# Patient Record
Sex: FEMALE | Race: WHITE | NOT HISPANIC OR LATINO | Employment: STUDENT | URBAN - METROPOLITAN AREA
[De-identification: names, ages, dates, MRNs, and addresses within clinical notes are randomized per-mention and may not be internally consistent; named-entity substitution may affect disease eponyms.]

---

## 2021-09-15 ENCOUNTER — HOSPITAL ENCOUNTER (EMERGENCY)
Facility: HOSPITAL | Age: 26
Discharge: HOME | End: 2021-09-15
Attending: EMERGENCY MEDICINE
Payer: COMMERCIAL

## 2021-09-15 VITALS
WEIGHT: 155 LBS | HEIGHT: 68 IN | RESPIRATION RATE: 16 BRPM | BODY MASS INDEX: 23.49 KG/M2 | SYSTOLIC BLOOD PRESSURE: 126 MMHG | TEMPERATURE: 98 F | HEART RATE: 75 BPM | OXYGEN SATURATION: 99 % | DIASTOLIC BLOOD PRESSURE: 71 MMHG

## 2021-09-15 DIAGNOSIS — G43.801 OTHER MIGRAINE WITH STATUS MIGRAINOSUS, NOT INTRACTABLE: Primary | ICD-10-CM

## 2021-09-15 LAB
B-HCG UR QL: NEGATIVE
CTP QC/QA: YES

## 2021-09-15 PROCEDURE — 63600175 PHARM REV CODE 636 W HCPCS: Performed by: EMERGENCY MEDICINE

## 2021-09-15 PROCEDURE — 25000003 PHARM REV CODE 250: Performed by: EMERGENCY MEDICINE

## 2021-09-15 PROCEDURE — 99284 EMERGENCY DEPT VISIT MOD MDM: CPT | Mod: 25

## 2021-09-15 PROCEDURE — 96375 TX/PRO/DX INJ NEW DRUG ADDON: CPT

## 2021-09-15 PROCEDURE — 81025 URINE PREGNANCY TEST: CPT | Performed by: EMERGENCY MEDICINE

## 2021-09-15 PROCEDURE — 96374 THER/PROPH/DIAG INJ IV PUSH: CPT

## 2021-09-15 RX ORDER — PROCHLORPERAZINE EDISYLATE 5 MG/ML
10 INJECTION INTRAMUSCULAR; INTRAVENOUS
Status: COMPLETED | OUTPATIENT
Start: 2021-09-15 | End: 2021-09-15

## 2021-09-15 RX ORDER — ELETRIPTAN HYDROBROMIDE 40 MG/1
40 TABLET, FILM COATED ORAL
COMMUNITY
End: 2022-02-23

## 2021-09-15 RX ORDER — DIPHENHYDRAMINE HYDROCHLORIDE 50 MG/ML
50 INJECTION INTRAMUSCULAR; INTRAVENOUS
Status: COMPLETED | OUTPATIENT
Start: 2021-09-15 | End: 2021-09-15

## 2021-09-15 RX ADMIN — PROCHLORPERAZINE EDISYLATE 10 MG: 5 INJECTION INTRAMUSCULAR; INTRAVENOUS at 05:09

## 2021-09-15 RX ADMIN — DIPHENHYDRAMINE HYDROCHLORIDE 50 MG: 50 INJECTION INTRAMUSCULAR; INTRAVENOUS at 05:09

## 2021-09-15 RX ADMIN — SODIUM CHLORIDE 1000 ML: 0.9 INJECTION, SOLUTION INTRAVENOUS at 05:09

## 2022-09-28 ENCOUNTER — HOSPITAL ENCOUNTER (EMERGENCY)
Facility: HOSPITAL | Age: 27
Discharge: HOME | End: 2022-09-28
Attending: EMERGENCY MEDICINE
Payer: COMMERCIAL

## 2022-09-28 VITALS
RESPIRATION RATE: 16 BRPM | OXYGEN SATURATION: 100 % | HEIGHT: 68 IN | SYSTOLIC BLOOD PRESSURE: 132 MMHG | HEART RATE: 64 BPM | TEMPERATURE: 98 F | DIASTOLIC BLOOD PRESSURE: 92 MMHG | WEIGHT: 150 LBS | BODY MASS INDEX: 22.73 KG/M2

## 2022-09-28 DIAGNOSIS — R11.0 NAUSEA: ICD-10-CM

## 2022-09-28 DIAGNOSIS — G43.809 OTHER MIGRAINE WITHOUT STATUS MIGRAINOSUS, NOT INTRACTABLE: Primary | ICD-10-CM

## 2022-09-28 LAB
B-HCG UR QL: NEGATIVE
CTP QC/QA: YES

## 2022-09-28 PROCEDURE — 96374 THER/PROPH/DIAG INJ IV PUSH: CPT

## 2022-09-28 PROCEDURE — 99284 EMERGENCY DEPT VISIT MOD MDM: CPT | Mod: 25

## 2022-09-28 PROCEDURE — 63600175 PHARM REV CODE 636 W HCPCS: Performed by: NURSE PRACTITIONER

## 2022-09-28 PROCEDURE — 96375 TX/PRO/DX INJ NEW DRUG ADDON: CPT

## 2022-09-28 PROCEDURE — 81025 URINE PREGNANCY TEST: CPT | Performed by: NURSE PRACTITIONER

## 2022-09-28 RX ORDER — METOCLOPRAMIDE HYDROCHLORIDE 5 MG/ML
10 INJECTION INTRAMUSCULAR; INTRAVENOUS
Status: COMPLETED | OUTPATIENT
Start: 2022-09-28 | End: 2022-09-28

## 2022-09-28 RX ORDER — DIPHENHYDRAMINE HYDROCHLORIDE 50 MG/ML
25 INJECTION INTRAMUSCULAR; INTRAVENOUS
Status: COMPLETED | OUTPATIENT
Start: 2022-09-28 | End: 2022-09-28

## 2022-09-28 RX ORDER — METOCLOPRAMIDE 10 MG/1
10 TABLET ORAL EVERY 6 HOURS
Qty: 30 TABLET | Refills: 0 | Status: SHIPPED | OUTPATIENT
Start: 2022-09-28

## 2022-09-28 RX ORDER — KETOROLAC TROMETHAMINE 30 MG/ML
15 INJECTION, SOLUTION INTRAMUSCULAR; INTRAVENOUS
Status: COMPLETED | OUTPATIENT
Start: 2022-09-28 | End: 2022-09-28

## 2022-09-28 RX ADMIN — KETOROLAC TROMETHAMINE 15 MG: 30 INJECTION, SOLUTION INTRAMUSCULAR; INTRAVENOUS at 07:09

## 2022-09-28 RX ADMIN — DIPHENHYDRAMINE HYDROCHLORIDE 25 MG: 50 INJECTION, SOLUTION INTRAMUSCULAR; INTRAVENOUS at 07:09

## 2022-09-28 RX ADMIN — METOCLOPRAMIDE 10 MG: 5 INJECTION, SOLUTION INTRAMUSCULAR; INTRAVENOUS at 07:09

## 2022-09-28 NOTE — Clinical Note
"Annemarie"Dave Gupta was seen and treated in our emergency department on 9/28/2022.  She may return to work on 10/01/2022.       If you have any questions or concerns, please don't hesitate to call.      Toshia Perrin NP"

## 2022-09-29 NOTE — ED PROVIDER NOTES
Encounter Date: 9/28/2022       History     Chief Complaint   Patient presents with    Headache     Pt c/o migraine for two days.      27-year-old female with a history of migraine headaches, presents to the ER today with complaints of ongoing migraine headache described as right frontal headache throbbing 8/10 in severity that has been ongoing and constant in nature for the past 3 days despite taking her prescribed migraine headache medication.  She states overall her headache feels similar to her prior migraines feel like that she suffers from, however this migraine has not been able to get relief from her daily and as needed medicine that is prescribed by her neurologist.  She reports associated nausea but reports no vomiting. She denies any vision changes.  No confusion.  No fever no neck pain or stiffness no rashes or other complaints or concerns.  She states her previous medical history.  She states she has needed to seek ER treatment for further management of her migraine headaches 1 time in the past and was able to get successful relief with IV medicines.  She is here for further management.    Review of patient's allergies indicates:   Allergen Reactions    Montelukast Itching    Sumatriptan      Past Medical History:   Diagnosis Date    Strep throat      Past Surgical History:   Procedure Laterality Date    INGUINAL HERNIA REPAIR       Family History   Problem Relation Age of Onset    Skin cancer Mother      Social History     Tobacco Use    Smoking status: Never    Smokeless tobacco: Never   Substance Use Topics    Alcohol use: No     Review of Systems   Constitutional:  Negative for chills, diaphoresis, fatigue and fever.   HENT:  Negative for congestion, ear discharge, ear pain, postnasal drip, rhinorrhea, sinus pressure, sinus pain, sneezing, sore throat and trouble swallowing.    Eyes:  Negative for photophobia and visual disturbance.   Respiratory:  Negative for cough, choking, chest tightness,  shortness of breath and wheezing.    Cardiovascular:  Negative for chest pain, palpitations and leg swelling.   Gastrointestinal:  Positive for nausea. Negative for abdominal pain, constipation, diarrhea and vomiting.   Endocrine: Negative for polydipsia, polyphagia and polyuria.   Genitourinary:  Negative for difficulty urinating, dysuria, flank pain and frequency.   Musculoskeletal:  Negative for back pain.   Skin:  Negative for rash.   Allergic/Immunologic: Negative for immunocompromised state.   Neurological:  Positive for headaches. Negative for dizziness, seizures, syncope, speech difficulty, weakness and light-headedness.   Hematological:  Does not bruise/bleed easily.   Psychiatric/Behavioral:  Negative for agitation and confusion.    All other systems reviewed and are negative.    Physical Exam     Initial Vitals [09/28/22 1905]   BP Pulse Resp Temp SpO2   (!) 156/100 75 18 98.8 °F (37.1 °C) 100 %      MAP       --         Physical Exam    Nursing note and vitals reviewed.  Constitutional: She appears well-developed and well-nourished. She is not diaphoretic. No distress.   HENT:   Head: Normocephalic and atraumatic.   Right Ear: External ear normal.   Left Ear: External ear normal.   Nose: Nose normal.   Mouth/Throat: Oropharynx is clear and moist. No oropharyngeal exudate.   Eyes: Conjunctivae are normal.   Neck: Neck supple.   Normal range of motion.  Cardiovascular:  Normal rate.           No murmur heard.  Pulmonary/Chest: Breath sounds normal. No respiratory distress. She has no wheezes. She has no rhonchi. She has no rales.   Abdominal: Abdomen is soft. Bowel sounds are normal. There is no abdominal tenderness.   Musculoskeletal:         General: No tenderness or edema. Normal range of motion.      Cervical back: Normal range of motion and neck supple.     Neurological: She is alert and oriented to person, place, and time. She has normal strength. GCS score is 15. GCS eye subscore is 4. GCS verbal  subscore is 5. GCS motor subscore is 6.   Skin: Skin is warm and dry. Capillary refill takes less than 2 seconds. No rash noted. No erythema.   Psychiatric: She has a normal mood and affect. Thought content normal.       ED Course   Procedures  Labs Reviewed   POCT URINE PREGNANCY     Results for orders placed or performed during the hospital encounter of 09/28/22   POCT urine pregnancy   Result Value Ref Range    POC Preg Test, Ur Negative Negative     Acceptable Yes           Imaging Results    None          Medications   sodium chloride 0.9% bolus 1,000 mL (has no administration in time range)   ketorolac injection 15 mg (15 mg Intravenous Given 9/28/22 1940)   metoclopramide HCl injection 10 mg (10 mg Intravenous Given 9/28/22 1941)   diphenhydrAMINE injection 25 mg (25 mg Intravenous Given 9/28/22 1942)     Medical Decision Making:   ED Management:  While in the ER patient received an IV with 1 L IV fluid bolus 15 mg of Toradol, 25 mg of Benadryl and 10 mg of Reglan IV and she was monitored for improvement in her symptoms.  After receiving the medicine and fluids, she states her headache is almost now completely resolved and feels much better.  We will discharge home with a prescription of Reglan and instructions for patient to follow back up with her neurologist for ER visit follow-up in re-evaluation due to her migraine headache. ER return precautions discussed in detail she understands she should return for any new or worse symptoms. She does not have a fever, no meningeal headache symptoms present on exam.  No red flag headaches symptoms present to necessitate head CT imaging today.  She appears in no distress at this time her vitals are stable and she is ready for discharge.                        Clinical Impression:   Final diagnoses:  [G43.809] Other migraine without status migrainosus, not intractable (Primary)  [R11.0] Nausea      ED Disposition Condition    Discharge Stable          ED  Prescriptions       Medication Sig Dispense Start Date End Date Auth. Provider    metoclopramide HCl (REGLAN) 10 MG tablet Take 1 tablet (10 mg total) by mouth every 6 (six) hours. 30 tablet 9/28/2022 -- Toshia Perrin NP          Follow-up Information       Follow up With Specialties Details Why Contact Info Additional Information    Jade Cueto MD Family Medicine Schedule an appointment as soon as possible for a visit in 3 days for ER visit follow up and re-evaluation [facility] [facility]  [address]  [facility] [address]  [phone]       Neurologist  Schedule an appointment as soon as possible for a visit in 2 days for ER visit follow up and re-evaluation      [facility] - Emergency Dept Emergency Medicine Go to  As needed, If symptoms worsen [address] [facility] [address]  [phone] 1st floor             Toshia Perrin NP  09/28/22 2023

## 2022-10-26 DIAGNOSIS — N64.89 OTHER SPECIFIED DISORDERS OF BREAST: Primary | ICD-10-CM

## 2022-11-25 ENCOUNTER — HOSPITAL ENCOUNTER (OUTPATIENT)
Dept: RADIOLOGY | Facility: HOSPITAL | Age: 27
Discharge: HOME | End: 2022-11-25
Attending: STUDENT IN AN ORGANIZED HEALTH CARE EDUCATION/TRAINING PROGRAM
Payer: COMMERCIAL

## 2022-11-25 DIAGNOSIS — N64.89 OTHER SPECIFIED DISORDERS OF BREAST: ICD-10-CM

## 2022-11-25 PROCEDURE — 76642 ULTRASOUND BREAST LIMITED: CPT | Mod: TC,PO,LT

## 2024-08-27 ENCOUNTER — TELEPHONE (OUTPATIENT)
Dept: MATERNAL FETAL MEDICINE | Facility: CLINIC | Age: 29
End: 2024-08-27
Payer: COMMERCIAL

## 2024-08-27 DIAGNOSIS — O43.102 PLACENTAL ABNORMALITY IN SECOND TRIMESTER: Primary | ICD-10-CM

## 2024-08-27 NOTE — TELEPHONE ENCOUNTER
Spoke with patient's  and informed him that he would need to call his insurance to clarify with them .    ----- Message from Toshia Ahn MA sent at 8/27/2024  2:15 PM CDT -----  Regarding: Inquiry about Coverage  Good Afternoon,     I received a call from patient's , Azar Gupta. He stated that his insurance has Dr. Barker listed twice. One says he is in network and the other says out of network.     Mr. Gupta wanted to know which it was. He can be reached at [phone].     Thanks,   Tsohia

## 2024-08-29 ENCOUNTER — HOSPITAL ENCOUNTER (EMERGENCY)
Facility: HOSPITAL | Age: 29
Discharge: HOME | End: 2024-08-29
Attending: EMERGENCY MEDICINE
Payer: COMMERCIAL

## 2024-08-29 VITALS
RESPIRATION RATE: 18 BRPM | SYSTOLIC BLOOD PRESSURE: 135 MMHG | HEIGHT: 68 IN | HEART RATE: 97 BPM | DIASTOLIC BLOOD PRESSURE: 85 MMHG | BODY MASS INDEX: 26.52 KG/M2 | OXYGEN SATURATION: 98 % | WEIGHT: 175 LBS | TEMPERATURE: 98 F

## 2024-08-29 DIAGNOSIS — O20.9 VAGINAL BLEEDING AFFECTING EARLY PREGNANCY: ICD-10-CM

## 2024-08-29 LAB
ALBUMIN SERPL BCP-MCNC: 3.5 G/DL (ref 3.5–5.2)
ALP SERPL-CCNC: 35 U/L (ref 55–135)
ALT SERPL W/O P-5'-P-CCNC: 40 U/L (ref 10–44)
ANION GAP SERPL CALC-SCNC: 6 MMOL/L (ref 8–16)
AST SERPL-CCNC: 23 U/L (ref 10–40)
BACTERIA #/AREA URNS HPF: ABNORMAL /HPF
BASOPHILS # BLD AUTO: 0.06 K/UL (ref 0–0.2)
BASOPHILS NFR BLD: 0.5 % (ref 0–1.9)
BILIRUB SERPL-MCNC: 0.2 MG/DL (ref 0.1–1)
BILIRUB UR QL STRIP: NEGATIVE
BUN SERPL-MCNC: 8 MG/DL (ref 6–20)
CALCIUM SERPL-MCNC: 9.2 MG/DL (ref 8.7–10.5)
CHLORIDE SERPL-SCNC: 104 MMOL/L (ref 95–110)
CLARITY UR: CLEAR
CO2 SERPL-SCNC: 26 MMOL/L (ref 23–29)
COLOR UR: YELLOW
CREAT SERPL-MCNC: 0.5 MG/DL (ref 0.5–1.4)
DIFFERENTIAL METHOD BLD: ABNORMAL
EOSINOPHIL # BLD AUTO: 0.4 K/UL (ref 0–0.5)
EOSINOPHIL NFR BLD: 3.2 % (ref 0–8)
ERYTHROCYTE [DISTWIDTH] IN BLOOD BY AUTOMATED COUNT: 13.7 % (ref 11.5–14.5)
EST. GFR  (NO RACE VARIABLE): >60 ML/MIN/1.73 M^2
GLUCOSE SERPL-MCNC: 89 MG/DL (ref 70–110)
GLUCOSE UR QL STRIP: NEGATIVE
HCG INTACT+B SERPL-ACNC: NORMAL MIU/ML
HCT VFR BLD AUTO: 33.8 % (ref 37–48.5)
HGB BLD-MCNC: 10.8 G/DL (ref 12–16)
HGB UR QL STRIP: ABNORMAL
IMM GRANULOCYTES # BLD AUTO: 0.1 K/UL (ref 0–0.04)
IMM GRANULOCYTES NFR BLD AUTO: 0.8 % (ref 0–0.5)
KETONES UR QL STRIP: NEGATIVE
LEUKOCYTE ESTERASE UR QL STRIP: NEGATIVE
LYMPHOCYTES # BLD AUTO: 1.8 K/UL (ref 1–4.8)
LYMPHOCYTES NFR BLD: 14.1 % (ref 18–48)
MCH RBC QN AUTO: 25.1 PG (ref 27–31)
MCHC RBC AUTO-ENTMCNC: 32 G/DL (ref 32–36)
MCV RBC AUTO: 78 FL (ref 82–98)
MICROSCOPIC COMMENT: ABNORMAL
MONOCYTES # BLD AUTO: 0.7 K/UL (ref 0.3–1)
MONOCYTES NFR BLD: 5.1 % (ref 4–15)
NEUTROPHILS # BLD AUTO: 9.8 K/UL (ref 1.8–7.7)
NEUTROPHILS NFR BLD: 76.3 % (ref 38–73)
NITRITE UR QL STRIP: NEGATIVE
NRBC BLD-RTO: 0 /100 WBC
PH UR STRIP: 7 [PH] (ref 5–8)
PLATELET # BLD AUTO: 232 K/UL (ref 150–450)
PMV BLD AUTO: 9.7 FL (ref 9.2–12.9)
POTASSIUM SERPL-SCNC: 3.4 MMOL/L (ref 3.5–5.1)
PROT SERPL-MCNC: 6.3 G/DL (ref 6–8.4)
PROT UR QL STRIP: NEGATIVE
RBC # BLD AUTO: 4.31 M/UL (ref 4–5.4)
RBC #/AREA URNS HPF: 0 /HPF (ref 0–4)
SODIUM SERPL-SCNC: 136 MMOL/L (ref 136–145)
SP GR UR STRIP: 1.01 (ref 1–1.03)
SQUAMOUS #/AREA URNS HPF: 3 /HPF
URN SPEC COLLECT METH UR: ABNORMAL
UROBILINOGEN UR STRIP-ACNC: NEGATIVE EU/DL
WBC # BLD AUTO: 12.81 K/UL (ref 3.9–12.7)
WBC #/AREA URNS HPF: 4 /HPF (ref 0–5)

## 2024-08-29 PROCEDURE — 85025 COMPLETE CBC W/AUTO DIFF WBC: CPT | Performed by: PHYSICIAN ASSISTANT

## 2024-08-29 PROCEDURE — 84702 CHORIONIC GONADOTROPIN TEST: CPT | Performed by: PHYSICIAN ASSISTANT

## 2024-08-29 PROCEDURE — 99284 EMERGENCY DEPT VISIT MOD MDM: CPT | Mod: 25

## 2024-08-29 PROCEDURE — 81001 URINALYSIS AUTO W/SCOPE: CPT | Performed by: PHYSICIAN ASSISTANT

## 2024-08-29 PROCEDURE — 80053 COMPREHEN METABOLIC PANEL: CPT | Performed by: PHYSICIAN ASSISTANT

## 2024-08-29 NOTE — Clinical Note
"Annemarie"Dave Gupta was seen and treated in our emergency department on 8/29/2024.  She may return to work on 08/31/2024.       If you have any questions or concerns, please don't hesitate to call.      Yanet Donovan PA-C"

## 2024-08-29 NOTE — DISCHARGE INSTRUCTIONS
Please return to the emergency department if you experience increasing vaginal bleeding. Pelvic rest is recommended until follow up with your OBGYN

## 2024-08-29 NOTE — ED PROVIDER NOTES
Encounter Date: 2024       History     Chief Complaint   Patient presents with    PREGNANCY PROBLEM     APPROX 18 WEEKS PREG    Vaginal Bleeding     X 1 DAY     This is a 29-year-old  female at 18 weeks pregnant presenting to the emergency department for vaginal spotting.  She currently follows up with Dr. Schwartz.  Patient states that on Monday she had an ultrasound where they informed her that she had a vas a previa.  She 1st experienced vaginal spotting yesterday and contacted Dr. Schwartz who told her that if it occurred again to go to the emergency department.  She then experienced vaginal spotting again today and she contacted  again for reassurance.  She was informed again to come to the emergency department.  She denies nausea, vomiting abdominal pain, vaginal discharge, fever.  Reports that she is Rh positive and has current paperwork with her from her OBGYN        Review of patient's allergies indicates:  No Known Allergies  Past Medical History:   Diagnosis Date    Strep throat      Past Surgical History:   Procedure Laterality Date    INGUINAL HERNIA REPAIR       Family History   Problem Relation Name Age of Onset    Skin cancer Mother       Social History     Tobacco Use    Smoking status: Never    Smokeless tobacco: Never   Substance Use Topics    Alcohol use: No     Review of Systems   Constitutional: Negative.    Respiratory: Negative.     Cardiovascular: Negative.    Gastrointestinal: Negative.    Genitourinary:  Positive for vaginal bleeding.       Physical Exam     Initial Vitals [24 1314]   BP Pulse Resp Temp SpO2   (!) 142/97 106 18 98.8 °F (37.1 °C) 99 %      MAP       --         Physical Exam    Vitals reviewed.  Constitutional: She appears well-developed and well-nourished. She is not diaphoretic. No distress.   HENT:   Mouth/Throat: Oropharynx is clear and moist.   Eyes: Conjunctivae and EOM are normal.   Neck:   Normal range of motion.  Cardiovascular:  Normal  rate and regular rhythm.           Pulmonary/Chest: Breath sounds normal.   Abdominal: Abdomen is soft. She exhibits no distension. There is no abdominal tenderness. There is no rebound and no guarding.   Genitourinary:    Vagina normal.      Genitourinary Comments: Speculum exam not performed due to vasa previa.      Musculoskeletal:      Cervical back: Normal range of motion.     Skin: Skin is warm. Capillary refill takes less than 2 seconds.         ED Course   Procedures  Labs Reviewed   CBC W/ AUTO DIFFERENTIAL - Abnormal       Result Value    WBC 12.81 (*)     RBC 4.31      Hemoglobin 10.8 (*)     Hematocrit 33.8 (*)     MCV 78 (*)     MCH 25.1 (*)     MCHC 32.0      RDW 13.7      Platelets 232      MPV 9.7      Immature Granulocytes 0.8 (*)     Gran # (ANC) 9.8 (*)     Immature Grans (Abs) 0.10 (*)     Lymph # 1.8      Mono # 0.7      Eos # 0.4      Baso # 0.06      nRBC 0      Gran % 76.3 (*)     Lymph % 14.1 (*)     Mono % 5.1      Eosinophil % 3.2      Basophil % 0.5      Differential Method Automated      Narrative:     Release to patient->Immediate   COMPREHENSIVE METABOLIC PANEL - Abnormal    Sodium 136      Potassium 3.4 (*)     Chloride 104      CO2 26      Glucose 89      BUN 8      Creatinine 0.5      Calcium 9.2      Total Protein 6.3      Albumin 3.5      Total Bilirubin 0.2      Alkaline Phosphatase 35 (*)     AST 23      ALT 40      eGFR >60.0      Anion Gap 6 (*)     Narrative:     Release to patient->Immediate   URINALYSIS, REFLEX TO URINE CULTURE - Abnormal    Specimen UA Urine, Clean Catch      Color, UA Yellow      Appearance, UA Clear      pH, UA 7.0      Specific Gravity, UA 1.015      Protein, UA Negative      Glucose, UA Negative      Ketones, UA Negative      Bilirubin (UA) Negative      Occult Blood UA 3+ (*)     Nitrite, UA Negative      Urobilinogen, UA Negative      Leukocytes, UA Negative      Narrative:     Specimen Source->Urine   URINALYSIS MICROSCOPIC - Abnormal    RBC, UA 0       WBC, UA 4      Bacteria Few (*)     Squam Epithel, UA 3      Microscopic Comment SEE COMMENT      Narrative:     Specimen Source->Urine   HCG, QUANTITATIVE    HCG Quant [id number]      Narrative:     Release to patient->Immediate          Imaging Results               US OB 14+ Wks, TransAbd, Single Gestation (Final result)  Result time 08/29/24 14:36:58      Final result by William Guadarrama MD (08/29/24 14:36:58)                   Narrative:    CLINICAL HISTORY:  ([id number])28 y/o  (1995) F    Labor and delivery complicated by vasa previa, not applicable or unspecified    TECHNIQUE:  (A#[id number], exam time 8/29/2024 14:32)    US OB 14+ WEEKS, TRANSABDOM, SINGLE GESTATION [id number]    Ultrasound of the gravid uterus and fetus was obtained using grayscale color flow and M-mode where appropriate.    COMPARISON:  None available.    FINDINGS:  Fetus: Single, live, intrauterine, breech position  Placenta:  Location: Anterior, abutting the internal cervical os.  In addition the cord is at the level of the internal cervical os (compatible with a history of acid previa).  Cord insertion: The placental cord insertion was not well visualized.  Amniotic fluid index: Within normal limits.    Fetal heart tones are identified at 153 beats per minute.    Biometry  Biparietal diameter: 4.3 cm (18 weeks 6 days)  Head circumference: 16.1 cm (18 weeks 6 days)  Abdomen circumference: 12.9 cm (18 weeks 4 days)  Femur length: 2.6 cm (18 weeks 0 days)    The average ultrasound age is 18 weeks 4 days +/-1 week 2 days.  HC/AC 1.24,which is within the normal range for the gestational age.  Estimated fetal weight of 235 g +/-35 g.    Maternal structures  Ovaries/adnexa: Not seen, obscured.  Cervix: 3.4 cm, closed without evidence of amniotic funneling.  Cul-de-sac: No abnormal fluid.      IMPRESSION  1. Single live intrauterine gestation with an average ultrasound age of 18 weeks 4 days +/-1 week 2 days.  2.  Anterior low-lying  placenta, and findings compatible with a history of as a previa.  Consider correlation with the symptoms, history and interval follow-up.      3.  Closed cervix without evidence of amniotic funneling, and normal LIVAN.    4.  Fetal heart tones identified at 153 beats per minute.    This report was flagged in Epic as abnormal.      Electronically signed by: William Guadarrama  Date:    2024  Time:    14:36                                     Medications - No data to display  Medical Decision Making  29-year-old  female presenting to the emergency department at 18 weeks pregnant with vaginal spotting.  Considerations include but not limited to threatened miscarriage, has a previa, placenta previa, ectopic pregnancy, spontaneous .  Vitals stable.  Patient afebrile. Was recently diagnosed with facet previa during a transvaginal ultrasound on Monday.  This is the patient's 2nd day spotting and she was informed by her OBGYN Dr. Schwartz to come to the emergency department.  Her labs are fairly unremarkable.  Physical exam is unremarkable.  Benign abdominal exam.  A speculum exam not performed due to vascular previa.  Ultrasound shows that her cervical os is closed.  Single intrauterine gestation at 18 weeks 4 days with fetal heart tones of 153 beats per minute.  I did contact Dr. Cohen who was obstetrics on-call to confirm that nothing more needs to be done for the patient.  She did confirm with me that the patient just needs pelvic rest and to follow up with her OBGYN in a week.  Patient does have an appointment scheduled with her MFM 1 week from today and an appointment with Dr. Schwartz in 2 weeks. Patient was informed to return to the emergency department if she continues to experience vaginal bleeding or if it is significantly increases.  Patient also informed that pelvic rest as necessary.  She verbalized her understanding of the plan and agreed.  Plan was discussed with my attending all questions were  answered at the bedside.                                      Clinical Impression:  Final diagnoses:  [O69.4XX0] Vasa previa  [O20.9] Vaginal bleeding affecting early pregnancy          ED Disposition Condition    Discharge Stable          ED Prescriptions    None       Follow-up Information       Follow up With Specialties Details Why Contact Info    María Elena Schwartz MD Obstetrics and Gynecology Call   [address] [place] [address]  [phone]               Yanet Donovan, PA-C  08/29/24 [id number]

## 2024-08-29 NOTE — FIRST PROVIDER EVALUATION
Emergency Department TeleTriage Encounter Note      CHIEF COMPLAINT    Chief Complaint   Patient presents with    PREGNANCY PROBLEM     APPROX 18 WEEKS PREG    Vaginal Bleeding     X 1 DAY       VITAL SIGNS   Initial Vitals [24 1314]   BP Pulse Resp Temp SpO2   (!) 142/97 106 18 98.8 °F (37.1 °C) 99 %      MAP       --            ALLERGIES    Review of patient's allergies indicates:  No Known Allergies    PROVIDER TRIAGE NOTE  Patient 18 weeks pregnant presenting with brown vaginal spotting. Patient has Vasa Previa. . OB advised her to come to the ED for evaluation.       ORDERS  Labs Reviewed - No data to display    ED Orders (720h ago, onward)      None              Virtual Visit Note: The provider triage portion of this emergency department evaluation and documentation was performed via [employer], a HIPAA-compliant telemedicine application, in concert with a tele-presenter in the room. A face to face patient evaluation with one of my colleagues will occur once the patient is placed in an emergency department room.      DISCLAIMER: This note was prepared with [employer] voice recognition transcription software. Garbled syntax, mangled pronouns, and other bizarre constructions may be attributed to that software system.

## 2024-09-06 ENCOUNTER — OFFICE VISIT (OUTPATIENT)
Dept: MATERNAL FETAL MEDICINE | Facility: CLINIC | Age: 29
End: 2024-09-06
Payer: COMMERCIAL

## 2024-09-06 ENCOUNTER — PROCEDURE VISIT (OUTPATIENT)
Dept: MATERNAL FETAL MEDICINE | Facility: CLINIC | Age: 29
End: 2024-09-06
Payer: COMMERCIAL

## 2024-09-06 VITALS
WEIGHT: 180.56 LBS | DIASTOLIC BLOOD PRESSURE: 89 MMHG | SYSTOLIC BLOOD PRESSURE: 135 MMHG | BODY MASS INDEX: 27.36 KG/M2 | HEIGHT: 68 IN | HEART RATE: 104 BPM

## 2024-09-06 DIAGNOSIS — Z36.89 ENCOUNTER FOR ULTRASOUND TO CHECK FETAL GROWTH: Primary | ICD-10-CM

## 2024-09-06 DIAGNOSIS — F41.9 ANXIETY DURING PREGNANCY: ICD-10-CM

## 2024-09-06 DIAGNOSIS — O46.8X2 SUBCHORIONIC HEMORRHAGE OF PLACENTA IN SECOND TRIMESTER: ICD-10-CM

## 2024-09-06 DIAGNOSIS — O43.102 PLACENTAL ABNORMALITY IN SECOND TRIMESTER: ICD-10-CM

## 2024-09-06 DIAGNOSIS — O99.340 ANXIETY DURING PREGNANCY: ICD-10-CM

## 2024-09-06 DIAGNOSIS — Z14.1 CYSTIC FIBROSIS CARRIER: ICD-10-CM

## 2024-09-06 DIAGNOSIS — O44.02 PLACENTA PREVIA IN SECOND TRIMESTER: Primary | ICD-10-CM

## 2024-09-06 PROCEDURE — 99999 PR PBB SHADOW E&M-EST. PATIENT-LVL III: CPT | Mod: PBBFAC,,, | Performed by: OBSTETRICS & GYNECOLOGY

## 2024-09-06 RX ORDER — MAGNESIUM 200 MG
TABLET ORAL ONCE
COMMUNITY

## 2024-09-06 RX ORDER — TRAZODONE HYDROCHLORIDE 100 MG/1
1 TABLET ORAL NIGHTLY
COMMUNITY
Start: 2024-07-05

## 2024-09-06 NOTE — ASSESSMENT & PLAN NOTE
1-6% of patients on a routine u/s have a previa between 10-20 wks. Around 70% resolve upon imaging in 3rd trimester.  Risks factors for previa include endometrial scarring in the upper segment of the uterus, multiple prior uterine surgeries (D&C), smoking, IVF and multiple gestations. Discussed need for , possible classical, if persistent. We discussed reasons vaginal delivery is not possible if placenta previa persists. Discussed risks of late /early term delivery.    We discussed her recent bleeding in the setting of noted EDDI (see below).  Discussed calling guidelines and possibility of life-threatnening bleeding.  Understands that with heavy VB calling 911 is necessary.      Recommendations:  Re-evaluation by ultrasound in 5 weeks to monitor for resolution vs persistence.  Pelvic rest suggested, with close monitoring for increasing bleeding.  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

## 2024-09-06 NOTE — ASSESSMENT & PLAN NOTE
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 These results were not available. Will defer to primary OB for further management.

## 2024-09-06 NOTE — ASSESSMENT & PLAN NOTE
Currently on Effexor 37.5mg daily and Trazadone 50mg daily  Sees Dr. Cueto outside of pregnancy for this.  This was not discussed with patient today.  Management per primary OB provider.

## 2024-09-06 NOTE — ASSESSMENT & PLAN NOTE
Although subchorionic hemorrhages in the first and early second trimester have been reported in association with a number of pregnancy complications, most of the associations between EDDI and these outcomes are weak. The more important predictor of immediate pregnancy outcome is the presence/absence of ongoing, heavy bleeding.   We discussed today's ultrasound finding of EDDI in the bottom edge of the placenta. The location of the EDDI is behind the placenta at the level of the cervix.   In most cases, the EDDI will resolve spontaneously and thus the only management option for subchorionic hematoma is expectant. Because of this location and the presence of a placenta previa, the patient is at a higher risk to have recurrent bleeding.  In general, maternal physical activity is unlikely to significantly change the outcome with an EDDI and bedrest is NOT indicated; however, discussed that the patient should place nothing in the vagina. She can perform activities, but should refrain from strenuous exercise, running or cycling at this time.  A subchorionic hematoma is not an indication to conduct a diagnostic evaluation for an acquired or inherited thrombophilia.    Recommendations:  Continue to monitor for bleeding and symptoms.  Encouraged patient to present to the ED with any vaginal bleeding episodes due to the presence of a placenta previa.  PTL and PROM signs and precautions were given.

## 2024-09-06 NOTE — PROGRESS NOTES
MATERNAL-FETAL MEDICINE   CONSULT NOTE    Provider requesting consultation: MD Lana    SUBJECTIVE:     Ms. Annemarie Gupta is a 29 y.o.  female with IUP at 19w3d who is seen in consultation by MFM for evaluation and management of:  Problem   Placenta Previa in Second Trimester   Subchorionic Hemorrhage of Placenta in Second Trimester   Cystic Fibrosis Carrier   Anxiety During Pregnancy     Patient has no complaints today. She is overall feeling well.  Patient denies any contractions/cramping, or leakage of fluid. Reports no longer having active vaginal bleeding (please see ED visit in system).       Medication List with Changes/Refills   Current Medications    IRON,CARB/VIT C/VIT B12/FOLIC (IRON 100 PLUS ORAL)    Take by mouth.    MAGNESIUM 200 MG TAB    Take by mouth once.    METOCLOPRAMIDE HCL (REGLAN) 10 MG TABLET    Take 1 tablet (10 mg total) by mouth every 6 (six) hours.    TRAZODONE (DESYREL) 100 MG TABLET    Take 1 tablet by mouth every evening.    VENLAFAXINE (EFFEXOR-XR) 75 MG 24 HR CAPSULE    Take 75 mg by mouth once daily.     Review of patient's allergies indicates:  No Known Allergies    PMH:  Past Medical History:   Diagnosis Date    Strep throat      PObHx:  OB History    Para Term  AB Living   1             SAB IAB Ectopic Multiple Live Births                  # Outcome Date GA Lbr Reilly/2nd Weight Sex Type Anes PTL Lv   1 Current              PSH:  Past Surgical History:   Procedure Laterality Date    INGUINAL HERNIA REPAIR       Family history:family history includes Skin cancer in her mother.    Social history: reports that she has never smoked. She has never used smokeless tobacco. She reports that she does not drink alcohol and does not use drugs.    Genetic history: The patient denies any inherited genetic diseases or birth defects in herself or her partner's personal history or family.    Objective:   /89 (BP Location: Left arm, Patient Position: Sitting, BP  "Method: Small (Automatic))   Pulse 104   Ht 5' 8" (1.727 m)   Wt 81.9 kg (180 lb 8.9 oz)   LMP 04/15/2024 (Approximate)   BMI 27.45 kg/m²     Physical Exam  Deferred    Ultrasound performed. See viewpoint for full ultrasound report.  A baker IUP with cardiac activity is identified.   Fetal size is appropriate for established dating ( g).   No fetal structural malformations are identified.   Transabdominal cervical length is normal.   On today's exam, a complete placenta previa is identified. The previa is asymmetric. In this case, the chance of resolution of the previa is good (approximately 70%). Of   note, there is a moderate subchorionic hematoma noted on the lower edge of the placenta, adjacent to the cervix. No evidence of a vasa previa is seen.     Significant labs/imaging:  None    ASSESSMENT/PLAN:     29 y.o.  female with IUP at 19w3d     Placenta previa in second trimester  1-6% of patients on a routine u/s have a previa between 10-20 wks. Around 70% resolve upon imaging in 3rd trimester.  Risks factors for previa include endometrial scarring in the upper segment of the uterus, multiple prior uterine surgeries (D&C), smoking, IVF and multiple gestations. Discussed need for , possible classical, if persistent. We discussed reasons vaginal delivery is not possible if placenta previa persists. Discussed risks of late /early term delivery.    We discussed her recent bleeding in the setting of noted EDDI (see below).  Discussed calling guidelines and possibility of life-threatnening bleeding.  Understands that with heavy VB calling 911 is necessary.      Recommendations:  Re-evaluation by ultrasound in 5 weeks to monitor for resolution vs persistence.  Pelvic rest suggested, with close monitoring for increasing bleeding.  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 The more important predictor of immediate pregnancy outcome is the presence/absence of ongoing, heavy bleeding.   We discussed today's ultrasound finding of EDDI in the bottom edge of the placenta. The location of the EDDI is behind the placenta at the level of the cervix.   In most cases, the EDDI will resolve spontaneously and thus the only management option for subchorionic hematoma is expectant. Because of this location and the presence of a placenta previa, the patient is at a higher risk to have recurrent bleeding.  In general, maternal physical activity is unlikely to significantly change the outcome with an EDDI and bedrest is NOT indicated; however, discussed that the patient should place nothing in the vagina. She can perform activities, but should refrain from strenuous exercise, running or cycling at this time.  A subchorionic hematoma is not an indication to conduct a diagnostic evaluation for an acquired or inherited thrombophilia.    Recommendations:  Continue to monitor for bleeding and symptoms.  Encouraged patient to present to the ED with any vaginal bleeding episodes due to the presence of a placenta previa.  PTL and PROM signs and precautions were given.      Cystic fibrosis carrier  Patient noted to be a CF carrier    Per the patient, her partner has been screened and was found to NOT be a carrier. These results were not available. Will defer to primary OB for further management.      Anxiety during pregnancy  Currently on Effexor 37.5mg daily and Trazadone 50mg daily  Sees Dr. Cueto  outside of pregnancy for this.  This was not discussed with patient today.  Management per primary OB provider.      FOLLOW UP:   F/u in 5 weeks for US/MFM visit      Cat Caruso MD  Obstetrics and Gynecology - PGY2       ATTENDING ATTESTATION  I have seen the patient and reviewed the Dr. Caruso' consultation note, assessment and plan. I have personally spoken to and discussed plan with the patient and agree with the findings. All changes made to the body of the note.    Patient was counseled that prenatal ultrasound studies have limitations. They do not detect all fetal, genetic, placental, and maternal abnormalities.   The patient was given an opportunity to ask questions about the management of her high risk pregnancy problems. She expressed an understanding of and agreement to the above impression and plan. All questions were answered to her satisfaction.    45 minutes of total time spent on the encounter, which includes face to face time and non-face to face time preparing to see the patient (eg, review of tests), obtaining and/or reviewing separately obtained history, documenting clinical information in the electronic or other health record, independently interpreting results (not separately reported) and communicating results to the patient/family/caregiver, or care coordination (not separately reported).        Larry Barker MD   Maternal-Fetal Medicine      Electronically Signed by Larry Barker September 6, 2024

## 2024-10-02 ENCOUNTER — PATIENT MESSAGE (OUTPATIENT)
Dept: MATERNAL FETAL MEDICINE | Facility: CLINIC | Age: 29
End: 2024-10-02
Payer: COMMERCIAL

## 2024-10-11 ENCOUNTER — OFFICE VISIT (OUTPATIENT)
Dept: MATERNAL FETAL MEDICINE | Facility: CLINIC | Age: 29
End: 2024-10-11
Payer: COMMERCIAL

## 2024-10-11 ENCOUNTER — PROCEDURE VISIT (OUTPATIENT)
Dept: MATERNAL FETAL MEDICINE | Facility: CLINIC | Age: 29
End: 2024-10-11
Payer: COMMERCIAL

## 2024-10-11 VITALS
SYSTOLIC BLOOD PRESSURE: 138 MMHG | DIASTOLIC BLOOD PRESSURE: 90 MMHG | WEIGHT: 189.63 LBS | BODY MASS INDEX: 28.83 KG/M2

## 2024-10-11 DIAGNOSIS — Z36.89 ENCOUNTER FOR ULTRASOUND TO ASSESS FETAL GROWTH: Primary | ICD-10-CM

## 2024-10-11 DIAGNOSIS — Z36.89 ENCOUNTER FOR ULTRASOUND TO CHECK FETAL GROWTH: ICD-10-CM

## 2024-10-11 PROCEDURE — 99213 OFFICE O/P EST LOW 20 MIN: CPT | Mod: S$GLB,,, | Performed by: OBSTETRICS & GYNECOLOGY

## 2024-10-11 PROCEDURE — 76817 TRANSVAGINAL US OBSTETRIC: CPT | Mod: S$GLB,,, | Performed by: OBSTETRICS & GYNECOLOGY

## 2024-10-11 PROCEDURE — 99999 PR PBB SHADOW E&M-EST. PATIENT-LVL III: CPT | Mod: PBBFAC,,, | Performed by: OBSTETRICS & GYNECOLOGY

## 2024-10-11 PROCEDURE — 76816 OB US FOLLOW-UP PER FETUS: CPT | Mod: S$GLB,,, | Performed by: OBSTETRICS & GYNECOLOGY

## 2024-10-11 RX ORDER — VENLAFAXINE HYDROCHLORIDE 37.5 MG/1
37.5 CAPSULE, EXTENDED RELEASE ORAL DAILY
COMMUNITY
Start: 2024-09-30

## 2024-10-12 NOTE — ASSESSMENT & PLAN NOTE
Patient counseled regarding diagnosis of vasa previa. The placenta appears to no longer be a previa.  A vasa previa occurs when fetal vessels run through the fetal membranes, over or near the internal cervical os (2 cm or less) and are unprotected by placenta or umbilical cord. Vasa previa occurs in approximately 1 in 2,500 deliveries and risk factors for vasa previa include: velamentous cord insertion, succenturiate or bilobed placenta, placenta previa or low-lying placenta in second trimester, IVF pregnancy, multiple gestation. When detected by 2nd trimester ultrasound, 15 -20% of vasa previa will resolve by the third trimester. Fetal hemorrhage or exsanguination may occur with rupture of membranes and fetal asphyxia secondary to compression of vessels overlying the cervix may occur with labor. Outcomes appear to be improved with prenatal diagnosis of vasa previa (survival rate is 97% with prenatal diagnosis vs. 44% with  diagnosis).   In general, because events associated with adverse outcomes in the setting of vasa previa generally occur almost instantaneously, inpatient management has not been demonstrated to improve  outcomes or avoid catastrophic events. We reviewed this today. The [facility] does not routinely recommend inpatient hospitalization for all patients in whom a vasa previa has been diagnosed.  delivery should be scheduled between 34 weeks 0 days-36 weeks 0 days once a diagnosis of unresolved vasa previa is made. Timing should be determined by episodes of threatened  labor, bleeding or other risk factors. If PTL or PROM or vaginal bleeding occur prior to this gestational age, C/S should be performed when these events occur if the fetus is viable. Delayed cord clamping should NOT be performed. Neonatology should be notified of the diagnosis prior to delivery in order to make appropriate preparations in case fetal bleeding occurs.  We will plan to reevaluate location  and for possible resolution.    Recommendations:  Repeat US and MFM consultation at 28 weeks and again at 32-34 weeks to assess for resolution  PTL/Bleeding precautions reviewed  Continue pelvic rest  Speculum exams only; avoid digital exams   delivery at 34 weeks 0 days - 36 weeks 0 days, earlier if clinically indicated, if persistent  Do not performed delayed cord clamping  Notify neonatology for delivery

## 2024-10-12 NOTE — PROGRESS NOTES
Maternal Fetal Medicine follow up consult    SUBJECTIVE:     Annemarie Gupta is a 29 y.o.  female with IUP at 24w3d who is seen in follow up consultation by MFM.  Pregnancy complications include:   Problem   Vasa Previa     Previous notes reviewed.   No changes to medical, surgical, family, social, or obstetric history.    Interval history since last MFM visit:   Patient has no complaints today. She is overall feeling well.  Patient denies any contractions/cramping, vaginal bleeding or leakage of fluid.  She reports good fetal movement.    Medications:  Current Outpatient Medications   Medication Instructions    cetirizine (ZYRTEC) 10 mg Cap Zyrtec    iron,carb/vit C/vit B12/folic (IRON 100 PLUS ORAL) Take by mouth.    magnesium 200 mg Tab Once     Care team members:  Lana - Primary OB     OBJECTIVE:   BP (!) 138/90   Wt 86 kg (189 lb 9.5 oz)   LMP 04/15/2024 (Approximate)   BMI 28.83 kg/m²     Physical Exam:  Deferred    Ultrasound performed. See viewpoint for full ultrasound report.  Solorzano live IUP  Fetal size is appropriate for gestational age, with the EFW (823 g) plotting at the 66% and the AC plotting at the 94%.   A limited repeat fetal anatomic survey appears normal.   The MVP is normal.   Transvaginal scan shows normal placental location. There is no longer evidence of a placenta previa. However, a small vasa previa is seen traversing close to the internal os. Exact course is difficult to determine. PCI was not well visualized today.    Significant labs/imaging:  None new    ASSESSMENT/PLAN:     29 y.o.  female with IUP at 24w3d    Vasa previa  Patient counseled regarding diagnosis of vasa previa. The placenta appears to no longer be a previa.  A vasa previa occurs when fetal vessels run through the fetal membranes, over or near the internal cervical os (2 cm or less) and are unprotected by placenta or umbilical cord. Vasa previa occurs in approximately 1 in 2,500 deliveries and risk  factors for vasa previa include: velamentous cord insertion, succenturiate or bilobed placenta, placenta previa or low-lying placenta in second trimester, IVF pregnancy, multiple gestation. When detected by 2nd trimester ultrasound, 15 -20% of vasa previa will resolve by the third trimester. Fetal hemorrhage or exsanguination may occur with rupture of membranes and fetal asphyxia secondary to compression of vessels overlying the cervix may occur with labor. Outcomes appear to be improved with prenatal diagnosis of vasa previa (survival rate is 97% with prenatal diagnosis vs. 44% with  diagnosis).   In general, because events associated with adverse outcomes in the setting of vasa previa generally occur almost instantaneously, inpatient management has not been demonstrated to improve  outcomes or avoid catastrophic events. We reviewed this today. The [facility] Section does not routinely recommend inpatient hospitalization for all patients in whom a vasa previa has been diagnosed.  delivery should be scheduled between 34 weeks 0 days-36 weeks 0 days once a diagnosis of unresolved vasa previa is made. Timing should be determined by episodes of threatened  labor, bleeding or other risk factors. If PTL or PROM or vaginal bleeding occur prior to this gestational age, C/S should be performed when these events occur if the fetus is viable. Delayed cord clamping should NOT be performed. Neonatology should be notified of the diagnosis prior to delivery in order to make appropriate preparations in case fetal bleeding occurs.  We will plan to reevaluate location and for possible resolution.    Recommendations:  Repeat US and MFM consultation at 28 weeks and again at 32-34 weeks to assess for resolution  PTL/Bleeding precautions reviewed  Continue pelvic rest  Speculum exams only; avoid digital exams   delivery at 34 weeks 0 days - 36 weeks 0 days, earlier if clinically indicated, if  persistent  Do not performed delayed cord clamping  Notify neonatology for delivery      Patient was counseled that prenatal ultrasound studies have limitations. They do not detect all fetal, genetic, placental, and maternal abnormalities.       FOLLOW UP:   A follow up ultrasound will be made for 4 weeks from today with a follow up MFM MD visit.    The patient was given an opportunity to ask questions about the management of her high risk pregnancy problems. She expressed an understanding of and agreement to the above impression and plan. All questions were answered to her satisfaction.    25 minutes of total time spent on the encounter, which includes face to face time and non-face to face time preparing to see the patient (eg, review of tests), obtaining and/or reviewing separately obtained history, documenting clinical information in the electronic or other health record, independently interpreting results (not separately reported) and communicating results to the patient/family/caregiver, or care coordination (not separately reported).        Larry Barker MD   Maternal-Fetal Medicine      Electronically Signed by Larry Barker October 11, 2024

## 2024-10-22 ENCOUNTER — PATIENT MESSAGE (OUTPATIENT)
Dept: RESEARCH | Facility: HOSPITAL | Age: 29
End: 2024-10-22
Payer: COMMERCIAL

## 2024-11-07 ENCOUNTER — PROCEDURE VISIT (OUTPATIENT)
Dept: MATERNAL FETAL MEDICINE | Facility: CLINIC | Age: 29
End: 2024-11-07
Payer: COMMERCIAL

## 2024-11-07 ENCOUNTER — OFFICE VISIT (OUTPATIENT)
Dept: MATERNAL FETAL MEDICINE | Facility: CLINIC | Age: 29
End: 2024-11-07
Payer: COMMERCIAL

## 2024-11-07 VITALS — SYSTOLIC BLOOD PRESSURE: 130 MMHG | WEIGHT: 199.31 LBS | BODY MASS INDEX: 30.3 KG/M2 | DIASTOLIC BLOOD PRESSURE: 88 MMHG

## 2024-11-07 DIAGNOSIS — Z36.89 ENCOUNTER FOR ULTRASOUND TO ASSESS FETAL GROWTH: ICD-10-CM

## 2024-11-07 DIAGNOSIS — Z36.89 ENCOUNTER FOR ULTRASOUND TO ASSESS FETAL GROWTH: Primary | ICD-10-CM

## 2024-11-07 PROCEDURE — 99999 PR PBB SHADOW E&M-EST. PATIENT-LVL III: CPT | Mod: PBBFAC,,, | Performed by: OBSTETRICS & GYNECOLOGY

## 2024-11-07 NOTE — PROGRESS NOTES
Maternal Fetal Medicine follow up consult    SUBJECTIVE:     Annemarie Gupta is a 29 y.o.  female with IUP at 28w2d who is seen in follow up consultation by MFM.  Pregnancy complications include:   Problem   Vasa Previa     Previous notes reviewed.   No changes to medical, surgical, family, social, or obstetric history.    Interval history since last MFM visit:   Patient has no complaints today. She is overall feeling well.  Patient denies any contractions/cramping, vaginal bleeding or leakage of fluid.  She reports good fetal movement.    Medications:  Current Outpatient Medications   Medication Instructions    cetirizine (ZYRTEC) 10 mg Cap Zyrtec    iron,carb/vit C/vit B12/folic (IRON 100 PLUS ORAL) Take by mouth.    magnesium 200 mg Tab Once     Care team members:  Lana - Primary OB     OBJECTIVE:   /88 (BP Location: Left arm, Patient Position: Sitting)   Wt 90.4 kg (199 lb 4.7 oz)   LMP 04/15/2024 (Approximate)   BMI 30.30 kg/m²     Physical Exam:  Deferred    Ultrasound performed. See viewpoint for full ultrasound report.  Solorzano live IUP  Fetal size is appropriate for gestational age, with the EFW (1397 g) plotting at the 59% and the AC plotting at the 90%.   A limited repeat fetal anatomic survey appears normal.   The MVP is normal.   A vasa previa is once again seen, just adjacent to the internal os.    Significant labs/imaging:  None new    ASSESSMENT/PLAN:     29 y.o.  female with IUP at 24w3d    Vasa previa  Please see original consult for full counseling and recommendations     We still see a vasa previa on today's ultrasound, with a vessel running adjacent to the internal os.  In general, because events associated with adverse outcomes in the setting of vasa previa generally occur almost instantaneously, inpatient management has not been demonstrated to improve  outcomes or avoid catastrophic events. We reviewed this today. The [facility] does not routinely recommend  inpatient hospitalization for all patients in whom a vasa previa has been diagnosed.  delivery should be scheduled between 34 weeks 0 days-36 weeks 0 days once a diagnosis of unresolved vasa previa is made. Timing should be determined by episodes of threatened  labor, bleeding or other risk factors. If PTL or PROM or vaginal bleeding occur prior to this gestational age, C/S should be performed when these events occur if the fetus is viable. Delayed cord clamping should NOT be performed. Neonatology should be notified of the diagnosis prior to delivery in order to make appropriate preparations in case fetal bleeding occurs.  Patient voiced her desires to delivery at [facility] and she will plan to discuss this with her physician.  We will plan to reevaluate timing and location of delivery at follow up visits.    Recommendations:  Repeat US and MFM consultation in 3 weeks  PTL/Bleeding precautions reviewed  Continue pelvic rest  Speculum exams only; avoid digital exams   delivery at 34 weeks 0 days - 36 weeks 0 days, earlier if clinically indicated, if persistent  Do not performed delayed cord clamping  Notify neonatology for delivery        Patient was counseled that prenatal ultrasound studies have limitations. They do not detect all fetal, genetic, placental, and maternal abnormalities.       FOLLOW UP:   A follow up ultrasound will be made for 4 weeks from today with a follow up MFM MD visit.    The patient was given an opportunity to ask questions about the management of her high risk pregnancy problems. She expressed an understanding of and agreement to the above impression and plan. All questions were answered to her satisfaction.    20 minutes of total time spent on the encounter, which includes face to face time and non-face to face time preparing to see the patient (eg, review of tests), obtaining and/or reviewing separately obtained history, documenting clinical information in  the electronic or other health record, independently interpreting results (not separately reported) and communicating results to the patient/family/caregiver, or care coordination (not separately reported).        Larry Barker MD   Maternal-Fetal Medicine      Electronically Signed by Larry Barker October 11, 2024

## 2024-11-07 NOTE — ASSESSMENT & PLAN NOTE
Please see original consult for full counseling and recommendations     We still see a vasa previa on today's ultrasound, with a vessel running adjacent to the internal os.  In general, because events associated with adverse outcomes in the setting of vasa previa generally occur almost instantaneously, inpatient management has not been demonstrated to improve  outcomes or avoid catastrophic events. We reviewed this today. The [facility] does not routinely recommend inpatient hospitalization for all patients in whom a vasa previa has been diagnosed.  delivery should be scheduled between 34 weeks 0 days-36 weeks 0 days once a diagnosis of unresolved vasa previa is made. Timing should be determined by episodes of threatened  labor, bleeding or other risk factors. If PTL or PROM or vaginal bleeding occur prior to this gestational age, C/S should be performed when these events occur if the fetus is viable. Delayed cord clamping should NOT be performed. Neonatology should be notified of the diagnosis prior to delivery in order to make appropriate preparations in case fetal bleeding occurs.  Patient voiced her desires to delivery at [facility] and she will plan to discuss this with her physician.  We will plan to reevaluate timing and location of delivery at follow up visits.    Recommendations:  Repeat US and MFM consultation in 3 weeks  PTL/Bleeding precautions reviewed  Continue pelvic rest  Speculum exams only; avoid digital exams   delivery at 34 weeks 0 days - 36 weeks 0 days, earlier if clinically indicated, if persistent  Do not performed delayed cord clamping  Notify neonatology for delivery

## 2024-11-07 NOTE — Clinical Note
Here is the note from our most recent visit. Vasa previa still seen. Please let me know if you have any questions. Clayton

## 2024-11-29 ENCOUNTER — PROCEDURE VISIT (OUTPATIENT)
Dept: MATERNAL FETAL MEDICINE | Facility: CLINIC | Age: 29
End: 2024-11-29
Payer: COMMERCIAL

## 2024-11-29 ENCOUNTER — LAB VISIT (OUTPATIENT)
Dept: LAB | Facility: OTHER | Age: 29
End: 2024-11-29
Attending: OBSTETRICS & GYNECOLOGY
Payer: COMMERCIAL

## 2024-11-29 ENCOUNTER — OFFICE VISIT (OUTPATIENT)
Dept: MATERNAL FETAL MEDICINE | Facility: CLINIC | Age: 29
End: 2024-11-29
Payer: COMMERCIAL

## 2024-11-29 VITALS
HEIGHT: 68 IN | WEIGHT: 205.13 LBS | BODY MASS INDEX: 31.09 KG/M2 | SYSTOLIC BLOOD PRESSURE: 141 MMHG | DIASTOLIC BLOOD PRESSURE: 94 MMHG

## 2024-11-29 DIAGNOSIS — Z36.89 ENCOUNTER FOR ULTRASOUND TO ASSESS FETAL GROWTH: ICD-10-CM

## 2024-11-29 DIAGNOSIS — O16.3 ELEVATED BLOOD PRESSURE AFFECTING PREGNANCY IN THIRD TRIMESTER, ANTEPARTUM: ICD-10-CM

## 2024-11-29 DIAGNOSIS — O16.9 ELEVATED BLOOD PRESSURE AFFECTING PREGNANCY, ANTEPARTUM: ICD-10-CM

## 2024-11-29 LAB
CREAT UR-MCNC: 74.3 MG/DL (ref 15–325)
PROT UR-MCNC: 10 MG/DL (ref 0–15)
PROT/CREAT UR: 0.13 MG/G{CREAT} (ref 0–0.2)

## 2024-11-29 PROCEDURE — 82570 ASSAY OF URINE CREATININE: CPT | Performed by: OBSTETRICS & GYNECOLOGY

## 2024-11-29 PROCEDURE — 99999 PR PBB SHADOW E&M-EST. PATIENT-LVL V: CPT | Mod: PBBFAC,,, | Performed by: OBSTETRICS & GYNECOLOGY

## 2024-11-29 NOTE — PROGRESS NOTES
"Maternal Fetal Medicine follow up consult    SUBJECTIVE:     Annemarie Gupta is a 29 y.o.  female with IUP at 31w3d who is seen in follow up consultation by MFM.  Pregnancy complications include:   Problem   Elevated Blood Pressure Affecting Pregnancy, Antepartum   Vasa Previa     Previous notes reviewed.   No changes to medical, surgical, family, social, or obstetric history.    Interval history since last MFM visit:   Patient has no complaints today. She is overall feeling well.  Patient denies any contractions/cramping, vaginal bleeding or leakage of fluid.  She reports good fetal movement.    Medications:  Current Outpatient Medications   Medication Instructions    cetirizine (ZYRTEC) 10 mg Cap Zyrtec    iron,carb/vit C/vit B12/folic (IRON 100 PLUS ORAL) Take by mouth.    magnesium 200 mg Tab Once     Care team members:  Lana - Primary OB     OBJECTIVE:   BP (!) 141/94 (Patient Position: Sitting)   Ht 5' 8" (1.727 m)   Wt 93.1 kg (205 lb 2.2 oz)   LMP 04/15/2024 (Approximate)   BMI 31.19 kg/m²     Physical Exam:  Deferred    Ultrasound performed. See viewpoint for full ultrasound report.  Solorzano live IUP  Fetal size is consistent with likely LGA growth profile, with the EFW plotting at the 57% (2192 g) and the AC plotting at the >99%.  The AC plots beyond 3 weeks ahead of EGA which is likely to be an indicator of evolving macrosomia.   A limited repeat fetal anatomic survey appears normal.   A vasa previa is once again visualized, running adjacent to the internal os.    Significant labs/imaging:  None new    ASSESSMENT/PLAN:     29 y.o.  female with IUP at 31w3d    Vasa previa  Please see original consult for full counseling and recommendations     We continue to see a vasa previa on today's ultrasound, with a vessel running adjacent to the internal os.  We briefly rediscussed risks and concern for bleeding in setting of labor or PROM.  The [facility] Section does not routinely recommend inpatient " hospitalization for all patients in whom a vasa previa has been diagnosed.  delivery should be scheduled between 34 weeks 0 days-36 weeks 0 days once a diagnosis of unresolved vasa previa is made. Delayed cord clamping should NOT be performed. Neonatology should be notified of the diagnosis prior to delivery in order to make appropriate preparations in case fetal bleeding occurs.  Patient voiced her desire to delivery at [facility] and we will plan to schedule her  today.  We discussed the option for monitoring inpatient if patient desired, closer to delivery, with limited data to support its use in asymptomatic patients. She declines at this time.    We discussed the options for late  steroids. In patients who have NOT previously received a course of  corticosteroids, the [facility] ALPS study showed significant reductions in need for  respiratory support in the first 72 hours of life and severe respiratory complications in neonates born between 34 0/7 - 36 6/7 weeks gestation who received a course of  corticosteroids. The study found a significant INCREASE in  hypoglycemia in infants who received  corticosteroids. Long term outcome in these babies have not been well studied and there is some evidence that may demonstrate some metabolic and neurologic reprogramming, although this has not fully been established.  The patient has been fully counseled on the risks and benefits of ALPS and remains unsure at this time. If patient does opt for this, we can plan to give her her first dose at her follow up appointment (within 7 days of delivery)    Patient to continue to received routine prenatal care with her primary OB. Consider moving visits to weekly at this time.    Recommendations:  Repeat US and MFM consultation in about 3 weeks - scheduled  PTL/Bleeding precautions reviewed  Continue pelvic rest  Speculum exams only; avoid digital  exams   delivery at 35w2d ( at 10a)  Do not performed delayed cord clamping  Notify neonatology for delivery      Elevated blood pressure affecting pregnancy, antepartum  Mild range BPs noted on patient during today's visit.   She remains asymptomatic.   She presented BP log from home/work that were all normotensive. Possible anxiety provoked while in clinic.   Nonetheless, will obtain labs today to ensure no PreE.   Patient to continue to monitor BPs and for symptoms at home.   Calling precautions were reviewed with patient.    Recommendations:  Labs today - ordered  Serial BPs at home. To call if persistently >150/100  Calling precautions reviewed.      Patient was counseled that prenatal ultrasound studies have limitations. They do not detect all fetal, genetic, placental, and maternal abnormalities.       FOLLOW UP:   A follow up ultrasound will be made for 3 weeks from today with a follow up MFM MD visit.    The patient was given an opportunity to ask questions about the management of her high risk pregnancy problems. She expressed an understanding of and agreement to the above impression and plan. All questions were answered to her satisfaction.    30 minutes of total time spent on the encounter, which includes face to face time and non-face to face time preparing to see the patient (eg, review of tests), obtaining and/or reviewing separately obtained history, documenting clinical information in the electronic or other health record, independently interpreting results (not separately reported) and communicating results to the patient/family/caregiver, or care coordination (not separately reported).        Larry Barker MD   Maternal-Fetal Medicine      Electronically Signed by Larry Barker 2024

## 2024-11-29 NOTE — Clinical Note
Here is the note from our most recent visit. Pratima ruth still seen. Will schedule delivery for her here. Please let me know if you have any questions. -Larry

## 2024-11-29 NOTE — ASSESSMENT & PLAN NOTE
Mild range BPs noted on patient during today's visit.   She remains asymptomatic.   She presented BP log from home/work that were all normotensive. Possible anxiety provoked while in clinic.   Nonetheless, will obtain labs today to ensure no PreE.   Patient to continue to monitor BPs and for symptoms at home.   Calling precautions were reviewed with patient.    Recommendations:  Labs today - ordered  Serial BPs at home. To call if persistently >150/100  Calling precautions reviewed.

## 2024-11-29 NOTE — ASSESSMENT & PLAN NOTE
Please see original consult for full counseling and recommendations     We continue to see a vasa previa on today's ultrasound, with a vessel running adjacent to the internal os.  We briefly rediscussed risks and concern for bleeding in setting of labor or PROM.  The [facility] Section does not routinely recommend inpatient hospitalization for all patients in whom a vasa previa has been diagnosed.  delivery should be scheduled between 34 weeks 0 days-36 weeks 0 days once a diagnosis of unresolved vasa previa is made. Delayed cord clamping should NOT be performed. Neonatology should be notified of the diagnosis prior to delivery in order to make appropriate preparations in case fetal bleeding occurs.  Patient voiced her desire to delivery at [facility] and we will plan to schedule her  today.  We discussed the option for monitoring inpatient if patient desired, closer to delivery, with limited data to support its use in asymptomatic patients. She declines at this time.    We discussed the options for late  steroids. In patients who have NOT previously received a course of  corticosteroids, the [facility] ALPS study showed significant reductions in need for  respiratory support in the first 72 hours of life and severe respiratory complications in neonates born between 34 0/7 - 36 6/7 weeks gestation who received a course of  corticosteroids. The study found a significant INCREASE in  hypoglycemia in infants who received  corticosteroids. Long term outcome in these babies have not been well studied and there is some evidence that may demonstrate some metabolic and neurologic reprogramming, although this has not fully been established.  The patient has been fully counseled on the risks and benefits of ALPS and remains unsure at this time. If patient does opt for this, we can plan to give her her first dose at her follow up appointment (within 7 days  of delivery)    Patient to continue to received routine prenatal care with her primary OB. Consider moving visits to weekly at this time.    Recommendations:  Repeat US and MFM consultation in about 3 weeks - scheduled  PTL/Bleeding precautions reviewed  Continue pelvic rest  Speculum exams only; avoid digital exams   delivery at 35w2d ( at 10a)  Do not performed delayed cord clamping  Notify neonatology for delivery

## 2024-12-05 ENCOUNTER — PATIENT MESSAGE (OUTPATIENT)
Dept: MATERNAL FETAL MEDICINE | Facility: CLINIC | Age: 29
End: 2024-12-05
Payer: COMMERCIAL

## 2024-12-19 ENCOUNTER — PROCEDURE VISIT (OUTPATIENT)
Dept: MATERNAL FETAL MEDICINE | Facility: CLINIC | Age: 29
End: 2024-12-19
Payer: COMMERCIAL

## 2024-12-19 ENCOUNTER — CLINICAL SUPPORT (OUTPATIENT)
Dept: OBSTETRICS AND GYNECOLOGY | Facility: CLINIC | Age: 29
End: 2024-12-19
Payer: COMMERCIAL

## 2024-12-19 ENCOUNTER — OFFICE VISIT (OUTPATIENT)
Dept: MATERNAL FETAL MEDICINE | Facility: CLINIC | Age: 29
End: 2024-12-19
Payer: COMMERCIAL

## 2024-12-19 VITALS
DIASTOLIC BLOOD PRESSURE: 92 MMHG | SYSTOLIC BLOOD PRESSURE: 145 MMHG | BODY MASS INDEX: 32.36 KG/M2 | WEIGHT: 212.88 LBS

## 2024-12-19 DIAGNOSIS — O13.3 GESTATIONAL HYPERTENSION, THIRD TRIMESTER: Primary | ICD-10-CM

## 2024-12-19 DIAGNOSIS — O16.3 ELEVATED BLOOD PRESSURE AFFECTING PREGNANCY IN THIRD TRIMESTER, ANTEPARTUM: Primary | ICD-10-CM

## 2024-12-19 DIAGNOSIS — Z36.89 ENCOUNTER FOR ULTRASOUND TO ASSESS FETAL GROWTH: ICD-10-CM

## 2024-12-19 PROCEDURE — 99999 PR PBB SHADOW E&M-EST. PATIENT-LVL I: CPT | Mod: PBBFAC,,,

## 2024-12-19 PROCEDURE — 99999 PR PBB SHADOW E&M-EST. PATIENT-LVL III: CPT | Mod: PBBFAC,,, | Performed by: OBSTETRICS & GYNECOLOGY

## 2024-12-19 PROCEDURE — 76817 TRANSVAGINAL US OBSTETRIC: CPT | Mod: S$GLB,,, | Performed by: OBSTETRICS & GYNECOLOGY

## 2024-12-19 PROCEDURE — 76816 OB US FOLLOW-UP PER FETUS: CPT | Mod: S$GLB,,, | Performed by: OBSTETRICS & GYNECOLOGY

## 2024-12-19 RX ORDER — BETAMETHASONE SODIUM PHOSPHATE AND BETAMETHASONE ACETATE 3; 3 MG/ML; MG/ML
12 INJECTION, SUSPENSION INTRA-ARTICULAR; INTRALESIONAL; INTRAMUSCULAR; SOFT TISSUE
Status: COMPLETED | OUTPATIENT
Start: 2024-12-19 | End: 2024-12-20

## 2024-12-19 RX ADMIN — BETAMETHASONE SODIUM PHOSPHATE AND BETAMETHASONE ACETATE 12 MG: 3; 3 INJECTION, SUSPENSION INTRA-ARTICULAR; INTRALESIONAL; INTRAMUSCULAR; SOFT TISSUE at 03:12

## 2024-12-19 NOTE — PROGRESS NOTES
Maternal Fetal Medicine follow up consult    SUBJECTIVE:     Annemarie Gupta is a 29 y.o.  female with IUP at 34w2d who is seen in follow up consultation by M.  Pregnancy complications include:   Problem   Gestational Hypertension, Third Trimester   Vasa Previa       Previous notes reviewed.   No changes to medical, surgical, family, social, or obstetric history.    Interval history since last M visit: Doing well today without complaint. Discussed elevated BPs, denies hx of CHTN or prior Rx for Nadolol that is recorded in Meds. Home BP log universally on target, suspect white coat element, though discussed that delivery outcomes are similar between white coat and classic hypertensive disorders in pregnancy.    Medications reviewed.    Care team members:  Dr. Schwartz - Primary OB       OBJECTIVE:   BP (!) 145/92 (BP Location: Right arm, Patient Position: Sitting)   Wt 96.6 kg (212 lb 13.7 oz)   LMP 04/15/2024 (Approximate)   BMI 32.36 kg/m²     Ultrasound performed. See viewpoint for full ultrasound report.  1. Fetal size is AGA with EFW 3123 g (74%).   2. Normal repeat limited fetal anatomic survey.   3. AFV is normal amount.   4. Vasa previa is again observed.        ASSESSMENT/PLAN:     29 y.o.  female with IUP at 34w2d    Gestational hypertension, third trimester  Elevated BPs through visits at [facility] dating back to 18 weeks.  Pt has no hx of CHTN and has been following home BPs through pregnancy without elevations. Reports Nadolol Rx in Meds from 2016 is spurious, has never been on an antihypertensive.   Today's BP is again sustained in the mild range, consistent with GHTN. Denies preE symptoms.      As pregnancy associated hypertension can evolve in severity, she remains at risk to develop severe features in the coming weeks. So long as BPs remain < 160/< 110 and lab/exam/symptoms remain stable, GHTN & preeclampsia without severe features can be managed expectantly with weekly prenatal visits  and twice weekly prenatal testing (BPPs and/or NSTs with weekly fluid checks). We recommend against initiation of antihypertensives, particularly in the outpatient setting; if antihypertensives are needed to maintain BP <160/< 110, she meets criteria for severe disease and should be managed inpatient through delivery. For non-severe pregnancy associated hypertension, delivery timing is 37 weeks. Severe features warrant inpatient management, with planned delivery at 34 weeks or at time of diagnosis if later. Hypertensive disorders of pregnancy increase risks for poor outcomes, in current/future pregnancies, as well as for long term cardiac related mortality. Recurrence can be prevented in future pregnancy with initiation of ASA 81 mg daily at 12 weeks gestation. Prevention of long term morbidities, including chronic hypertension and coronary artery disease, will depend on establishing regular care with a primary care provider.    Recommendations:  1. Can continue expectant outpatient management through 37w unless patient develops:  - Severe HA non-responsive to acetaminophen, visual disturbances, acute onset shortness of breath or RUQ pain  - Persistent SBP > 160 or DBP > 110  - Laboratory evidence of end organ failure, including thrombocytopenia < 100K, elevated Cr (2x baseline levels or > 1.1), elevated transaminases (2x upper limit of normal)  - Other findings suggestive of severe disease such as elevated lactate dehydrogenase or evidence of pulmonary edema  - In such scenarios the target delivery date is 34w  2. Recommend against the outpatient use of antihypertensives for pregnancy associated hypertension  - If inpatient medications are required, we generally recommend titration of blood pressure medications after 32w once patient is through the steroid window  3. Prenatal testing twice weekly, plan for Monday NST/fluid check here at [facility], [place]  4. Primary team to assess preeclampsia labs (primary team to  coordinate)   5. For future pregnancies, recommend initiation of ASA 81 mg daily at 12 weeks  6. Recommend patient establish primary care provider if she has not already done so    Vasa previa  See prior counseling. Findings today stable as above.    Recommendations:  1. BMZ dose 1 of 2 today, will return to clinic tomorrow for repeat dose  2. Agree with scheduled 12/26 primary LTCS with Dr. Luis  3. Bleeding/labor/LOF precautions    No further MFM visits were scheduled      ANI Harris MD/MPH  Maternal Fetal Medicine

## 2024-12-19 NOTE — ASSESSMENT & PLAN NOTE
Elevated BPs through visits at [facility] dating back to 18 weeks.  Pt has no hx of CHTN and has been following home BPs through pregnancy without elevations. Reports Nadolol Rx in Meds from 2016 is spurious, has never been on an antihypertensive.   Today's BP is again sustained in the mild range, consistent with GHTN. Denies preE symptoms.      As pregnancy associated hypertension can evolve in severity, she remains at risk to develop severe features in the coming weeks. So long as BPs remain < 160/< 110 and lab/exam/symptoms remain stable, GHTN & preeclampsia without severe features can be managed expectantly with weekly prenatal visits and twice weekly prenatal testing (BPPs and/or NSTs with weekly fluid checks). We recommend against initiation of antihypertensives, particularly in the outpatient setting; if antihypertensives are needed to maintain BP <160/< 110, she meets criteria for severe disease and should be managed inpatient through delivery. For non-severe pregnancy associated hypertension, delivery timing is 37 weeks. Severe features warrant inpatient management, with planned delivery at 34 weeks or at time of diagnosis if later. Hypertensive disorders of pregnancy increase risks for poor outcomes, in current/future pregnancies, as well as for long term cardiac related mortality. Recurrence can be prevented in future pregnancy with initiation of ASA 81 mg daily at 12 weeks gestation. Prevention of long term morbidities, including chronic hypertension and coronary artery disease, will depend on establishing regular care with a primary care provider.    Recommendations:  1. Can continue expectant outpatient management through 37w unless patient develops:  - Severe HA non-responsive to acetaminophen, visual disturbances, acute onset shortness of breath or RUQ pain  - Persistent SBP > 160 or DBP > 110  - Laboratory evidence of end organ failure, including thrombocytopenia < 100K, elevated Cr (2x baseline levels  or > 1.1), elevated transaminases (2x upper limit of normal)  - Other findings suggestive of severe disease such as elevated lactate dehydrogenase or evidence of pulmonary edema  - In such scenarios the target delivery date is 34w  2. Recommend against the outpatient use of antihypertensives for pregnancy associated hypertension  - If inpatient medications are required, we generally recommend titration of blood pressure medications after 32w once patient is through the steroid window  3. Prenatal testing twice weekly, plan for Monday NST/fluid check here at [facility], [place], operated by [facility]  4. Primary team to assess preeclampsia labs (primary team to coordinate)   5. For future pregnancies, recommend initiation of ASA 81 mg daily at 12 weeks  6. Recommend patient establish primary care provider if she has not already done so

## 2024-12-19 NOTE — ASSESSMENT & PLAN NOTE
See prior counseling. Findings today stable as above.    Recommendations:  1. BMZ dose 1 of 2 today, will return to clinic tomorrow for repeat dose  2. Agree with scheduled 12/26 primary LTCS with Dr. Luis  3. Bleeding/labor/LOF precautions

## 2024-12-19 NOTE — PROGRESS NOTES
Patient here for injection, no pain noted. Injection given, tolerated well. Advised to wait in lobby 10 minutes. Report any adverse reactions.    betamethasone acetate-betamethasone sodium phosphate injection 12 mg     Site:Left VG

## 2024-12-20 ENCOUNTER — CLINICAL SUPPORT (OUTPATIENT)
Dept: OBSTETRICS AND GYNECOLOGY | Facility: CLINIC | Age: 29
End: 2024-12-20
Payer: COMMERCIAL

## 2024-12-20 ENCOUNTER — HOSPITAL ENCOUNTER (EMERGENCY)
Facility: OTHER | Age: 29
Discharge: HOME | End: 2024-12-20
Attending: STUDENT IN AN ORGANIZED HEALTH CARE EDUCATION/TRAINING PROGRAM
Payer: COMMERCIAL

## 2024-12-20 VITALS
RESPIRATION RATE: 20 BRPM | TEMPERATURE: 98 F | OXYGEN SATURATION: 98 % | SYSTOLIC BLOOD PRESSURE: 147 MMHG | DIASTOLIC BLOOD PRESSURE: 97 MMHG | HEART RATE: 112 BPM

## 2024-12-20 DIAGNOSIS — M54.9 BILATERAL BACK PAIN, UNSPECIFIED BACK LOCATION, UNSPECIFIED CHRONICITY: Primary | ICD-10-CM

## 2024-12-20 DIAGNOSIS — R10.2 PELVIC PRESSURE IN PREGNANCY, ANTEPARTUM, THIRD TRIMESTER: ICD-10-CM

## 2024-12-20 DIAGNOSIS — O26.893 PELVIC PRESSURE IN PREGNANCY, ANTEPARTUM, THIRD TRIMESTER: ICD-10-CM

## 2024-12-20 DIAGNOSIS — Z3A.34 34 WEEKS GESTATION OF PREGNANCY: ICD-10-CM

## 2024-12-20 LAB
ALBUMIN SERPL BCP-MCNC: 2.5 G/DL (ref 3.5–5.2)
ALP SERPL-CCNC: 114 U/L (ref 40–150)
ALT SERPL W/O P-5'-P-CCNC: 13 U/L (ref 10–44)
ANION GAP SERPL CALC-SCNC: 11 MMOL/L (ref 8–16)
AST SERPL-CCNC: 11 U/L (ref 10–40)
BASOPHILS # BLD AUTO: 0.06 K/UL (ref 0–0.2)
BASOPHILS NFR BLD: 0.4 % (ref 0–1.9)
BILIRUB SERPL-MCNC: 0.1 MG/DL (ref 0.1–1)
BILIRUBIN, POC UA: NEGATIVE
BLOOD, POC UA: NEGATIVE
BUN SERPL-MCNC: 5 MG/DL (ref 6–20)
CALCIUM SERPL-MCNC: 8.9 MG/DL (ref 8.7–10.5)
CHLORIDE SERPL-SCNC: 108 MMOL/L (ref 95–110)
CLARITY, UA: CLEAR
CO2 SERPL-SCNC: 18 MMOL/L (ref 23–29)
COLOR, UA: YELLOW
CREAT SERPL-MCNC: 0.6 MG/DL (ref 0.5–1.4)
CREAT UR-MCNC: 40 MG/DL (ref 15–325)
DIFFERENTIAL METHOD BLD: ABNORMAL
EOSINOPHIL # BLD AUTO: 0.1 K/UL (ref 0–0.5)
EOSINOPHIL NFR BLD: 0.5 % (ref 0–8)
ERYTHROCYTE [DISTWIDTH] IN BLOOD BY AUTOMATED COUNT: 13.8 % (ref 11.5–14.5)
EST. GFR  (NO RACE VARIABLE): >60 ML/MIN/1.73 M^2
GLUCOSE SERPL-MCNC: 115 MG/DL (ref 70–110)
GLUCOSE, POC UA: 100 MG/DL
HCT VFR BLD AUTO: 32.6 % (ref 37–48.5)
HGB BLD-MCNC: 10.7 G/DL (ref 12–16)
IMM GRANULOCYTES # BLD AUTO: 0.58 K/UL (ref 0–0.04)
IMM GRANULOCYTES NFR BLD AUTO: 4.2 % (ref 0–0.5)
KETONES, POC UA: NEGATIVE
LEUKOCYTE EST, POC UA: NEGATIVE
LYMPHOCYTES # BLD AUTO: 1.8 K/UL (ref 1–4.8)
LYMPHOCYTES NFR BLD: 12.9 % (ref 18–48)
MCH RBC QN AUTO: 26.5 PG (ref 27–31)
MCHC RBC AUTO-ENTMCNC: 32.8 G/DL (ref 32–36)
MCV RBC AUTO: 81 FL (ref 82–98)
MONOCYTES # BLD AUTO: 1 K/UL (ref 0.3–1)
MONOCYTES NFR BLD: 6.9 % (ref 4–15)
NEUTROPHILS # BLD AUTO: 10.4 K/UL (ref 1.8–7.7)
NEUTROPHILS NFR BLD: 75.1 % (ref 38–73)
NITRITE, POC UA: NEGATIVE
NRBC BLD-RTO: 0 /100 WBC
PH UR STRIP: 6.5 [PH] (ref 5–8)
PLATELET # BLD AUTO: 213 K/UL (ref 150–450)
PMV BLD AUTO: 10.1 FL (ref 9.2–12.9)
POTASSIUM SERPL-SCNC: 3.4 MMOL/L (ref 3.5–5.1)
PROT SERPL-MCNC: 5.9 G/DL (ref 6–8.4)
PROT UR-MCNC: <7 MG/DL (ref 0–15)
PROT/CREAT UR: NORMAL MG/G{CREAT} (ref 0–0.2)
PROTEIN, POC UA: NEGATIVE
RBC # BLD AUTO: 4.04 M/UL (ref 4–5.4)
SODIUM SERPL-SCNC: 137 MMOL/L (ref 136–145)
SPECIFIC GRAVITY, POC UA: 1.01 (ref 1–1.03)
UROBILINOGEN, POC UA: 0.2 E.U./DL
WBC # BLD AUTO: 13.89 K/UL (ref 3.9–12.7)

## 2024-12-20 PROCEDURE — 82570 ASSAY OF URINE CREATININE: CPT

## 2024-12-20 PROCEDURE — 99999 PR PBB SHADOW E&M-EST. PATIENT-LVL I: CPT | Mod: PBBFAC,,,

## 2024-12-20 PROCEDURE — 59025 FETAL NON-STRESS TEST: CPT | Mod: 26,,, | Performed by: STUDENT IN AN ORGANIZED HEALTH CARE EDUCATION/TRAINING PROGRAM

## 2024-12-20 PROCEDURE — 25000003 PHARM REV CODE 250: Performed by: STUDENT IN AN ORGANIZED HEALTH CARE EDUCATION/TRAINING PROGRAM

## 2024-12-20 PROCEDURE — 25000003 PHARM REV CODE 250

## 2024-12-20 PROCEDURE — 99284 EMERGENCY DEPT VISIT MOD MDM: CPT | Mod: 25,,, | Performed by: STUDENT IN AN ORGANIZED HEALTH CARE EDUCATION/TRAINING PROGRAM

## 2024-12-20 PROCEDURE — 85025 COMPLETE CBC W/AUTO DIFF WBC: CPT

## 2024-12-20 PROCEDURE — 59025 FETAL NON-STRESS TEST: CPT

## 2024-12-20 PROCEDURE — 99284 EMERGENCY DEPT VISIT MOD MDM: CPT | Mod: 25

## 2024-12-20 PROCEDURE — 80053 COMPREHEN METABOLIC PANEL: CPT

## 2024-12-20 RX ORDER — ACETAMINOPHEN 500 MG
1000 TABLET ORAL ONCE
Status: COMPLETED | OUTPATIENT
Start: 2024-12-20 | End: 2024-12-20

## 2024-12-20 RX ORDER — CYCLOBENZAPRINE HCL 10 MG
10 TABLET ORAL 3 TIMES DAILY PRN
Qty: 15 TABLET | Refills: 0 | Status: SHIPPED | OUTPATIENT
Start: 2024-12-20 | End: 2024-12-25

## 2024-12-20 RX ORDER — CYCLOBENZAPRINE HCL 5 MG
5 TABLET ORAL ONCE
Status: COMPLETED | OUTPATIENT
Start: 2024-12-20 | End: 2024-12-20

## 2024-12-20 RX ADMIN — ACETAMINOPHEN 1000 MG: 500 TABLET, FILM COATED ORAL at 04:12

## 2024-12-20 RX ADMIN — BETAMETHASONE SODIUM PHOSPHATE AND BETAMETHASONE ACETATE 12 MG: 3; 3 INJECTION, SUSPENSION INTRA-ARTICULAR; INTRALESIONAL; INTRAMUSCULAR; SOFT TISSUE at 03:12

## 2024-12-20 RX ADMIN — CYCLOBENZAPRINE HYDROCHLORIDE 5 MG: 5 TABLET, FILM COATED ORAL at 05:12

## 2024-12-20 NOTE — DISCHARGE INSTRUCTIONS
Contact your primary OB or after hours at [phone] if you experience any of the following:    Contractions every 7-10 minutes for 1 or more hours.   A sudden gush or constant leaking of fluid.  Heavy vaginal bleeding.   If you experience a constant headache, blurry vision, pain underneath your right rib, or sudden swelling of your hands, feet, and face.   If you are 28 weeks pregnant or greater, you can measure kick counts with a goal of 10 or more movements within 2 hours.     Remember to stay hydrated; drink 8-10 bottles of water a day.     Maintain all follow-up appointments.

## 2024-12-20 NOTE — PROGRESS NOTES
Patient here for betamethasone 2 of 2 injection. No pain noted, injection given. Patient tolerated well advised to wait in lobby 5 minutes and report any adverse reactions.       Site: rb

## 2024-12-20 NOTE — ED TRIAGE NOTES
Patient presents to [facility] c/o back pain that she rates as 7/10 on pain scale.  Says she has applied heat and taken tylenol and it hasn't helped.  Sees her primary OB in [place] but is a patient of [facility] and is planning on delivering here.  Denies VB, LOF, HA, RUQ pain, CP.  +FM.  Dr. Menon notified of pt's arrival.

## 2024-12-20 NOTE — ED PROVIDER NOTES
Encounter Date: 2024       History     Chief Complaint   Patient presents with    Back Pain     Comes and goes.  Pre-existing vasa previa.      Annemarie Gupta is a 29 y.o. F at 34w3d presents complaining of severe back pain.     Patient reports she has been experiencing severe back pain that has been unresponsive to tylenol. Patient states her discomfort feels like back spasms she reports previous h/o sciatica but states this feels different. Patient reports concern given her history of vasa previa and wanted to be evaluated. Patient unsure if her pelvic discomfort are contractions, she denies vaginal bleeding, LOF and reports good fetal movement.     This IUP is complicated by Anxiety, gHTN, Vasa Previa, Subchorionic Hemorrhage   .                  Review of patient's allergies indicates:  No Known Allergies  Past Medical History:   Diagnosis Date    Strep throat      Past Surgical History:   Procedure Laterality Date     SECTION N/A 2024    Procedure:  SECTION;  Surgeon: Sherly Luis MD;  Location: [facility];  Service: OB/GYN;  Laterality: N/A;    INGUINAL HERNIA REPAIR       Family History   Problem Relation Name Age of Onset    Skin cancer Mother       Social History     Tobacco Use    Smoking status: Never    Smokeless tobacco: Never   Substance Use Topics    Alcohol use: No    Drug use: Never     Review of Systems   Constitutional:  Negative for fever.   HENT:  Negative for sore throat.    Respiratory:  Negative for shortness of breath.    Cardiovascular:  Negative for chest pain.   Gastrointestinal:  Negative for nausea.   Genitourinary:  Positive for pelvic pain (pelvic pressure). Negative for dysuria.   Musculoskeletal:  Positive for back pain.   Skin:  Negative for rash.   Neurological:  Negative for weakness.   Hematological:  Does not bruise/bleed easily.       Physical Exam     Initial Vitals   BP Pulse Resp Temp SpO2   24 1612 24 1601 24 1601  12/20/24 1603 12/20/24 1601   (!) 151/84 (!) 112 20 98 °F (36.7 °C) 98 %      MAP       --                Physical Exam    Constitutional: She appears well-developed and well-nourished.   HENT:   Head: Normocephalic and atraumatic.   Eyes: EOM are normal. Pupils are equal, round, and reactive to light.   Neck:   Normal range of motion.  Cardiovascular:  Normal rate.           Pulmonary/Chest: No respiratory distress.   Abdominal: Abdomen is soft. There is no abdominal tenderness. There is no rebound.   Musculoskeletal:         General: Normal range of motion.      Cervical back: Normal range of motion.     Neurological: She is alert and oriented to person, place, and time.   Skin: Skin is warm and dry. Capillary refill takes less than 2 seconds.   Psychiatric: She has a normal mood and affect.     OB LABOR EXAM:   Pre-Term Labor: No.   Membranes ruptured: No.   Method: Sterile speculum exam per MD.                 Comments: SVE: visually closed, no bleeding noted in vaginal vault or coming from cervix        ED Course   Fetal non-stress test    Date/Time: 12/20/2024 11:23 PM    Performed by: Angelita Menon MD  Authorized by: Angelita Menon MD    Nonstress Test:     Variability:  6-25 BPM    Decelerations:  None    Accelerations:  15 bpm    Baseline:  140    Uterine Irritability: No      Contraction comment: quiet.  Biophysical Profile:     Nonstress Test Interpretation: reactive      Overall Impression:  Reassuring  Post-procedure:     Patient tolerance:  Patient tolerated the procedure well with no immediate complications    Labs Reviewed   COMPREHENSIVE METABOLIC PANEL - Abnormal       Result Value    Sodium 137      Potassium 3.4 (*)     Chloride 108      CO2 18 (*)     Glucose 115 (*)     BUN 5 (*)     Creatinine 0.6      Calcium 8.9      Total Protein 5.9 (*)     Albumin 2.5 (*)     Total Bilirubin 0.1      Alkaline Phosphatase 114      AST 11      ALT 13      eGFR >60      Anion Gap 11     CBC W/  AUTO DIFFERENTIAL - Abnormal    WBC 13.89 (*)     RBC 4.04      Hemoglobin 10.7 (*)     Hematocrit 32.6 (*)     MCV 81 (*)     MCH 26.5 (*)     MCHC 32.8      RDW 13.8      Platelets 213      MPV 10.1      Immature Granulocytes 4.2 (*)     Gran # (ANC) 10.4 (*)     Immature Grans (Abs) 0.58 (*)     Lymph # 1.8      Mono # 1.0      Eos # 0.1      Baso # 0.06      nRBC 0      Gran % 75.1 (*)     Lymph % 12.9 (*)     Mono % 6.9      Eosinophil % 0.5      Basophil % 0.4      Differential Method Automated     POCT URINALYSIS W/O SCOPE - Abnormal    Spec Grav UA 1.015      PH, UA 6.5      Protein, UA Negative      Glucose,  (*)     Ketones, UA Negative      Bilirubin, UA Negative      Blood, UA Negative      Leukocytes, UA Negative      Nitrite, UA Negative      Urobilinogen, UA 0.2      Color, UA POC Yellow      Clarity, UA, POC Clear     PROTEIN / CREATININE RATIO, URINE    Protein, Urine Random <7      Creatinine, Urine 40.0      Prot/Creat Ratio, Urine Unable to calculate      Narrative:     Specimen Source->Urine          Imaging Results    None          Medications   acetaminophen tablet 1,000 mg (1,000 mg Oral Given 24 1636)   cyclobenzaprine tablet 5 mg (5 mg Oral Given 24 1734)     Medical Decision Making  Annemarie Gupta is a 29 y.o. F at 34w3d presents complaining of back pain.     1. Back pain  - PE: As stated above  - Sterile speculum exam: visually closed  - NST/[place]: 140 bpm, mod luis alfredo, +accels/-decels, toco quiet  - Labs: UA unremarkable  - Meds: s/p 1 g tylenol & flexeril   - Rx to pharmacy: Flexeril PRN    2. gHTN   - BP: (139-151)/(81-97) 147/97  - Labs: CBC & CMP WNL, PCR: TLTC    Patient Stable and Amendable to discharge. Strict ED return precautions given, patient expressed understanding and is in agreement with plan       Angelita Meonn MD  [facility]   OBGYN PGY1    Amount and/or Complexity of Data Reviewed  Labs: ordered.    Risk  OTC drugs.  Prescription  drug management.              Attending Attestation:   Physician Attestation Statement for Resident:  As the supervising MD   Physician Attestation Statement: I have personally seen and examined this patient.   I agree with the above history.  -:   As the supervising MD I agree with the above PE.     As the supervising MD I agree with the above treatment, course, plan, and disposition.   I was personally present during the critical portions of the procedure(s) performed by the resident and was immediately available in the ED to provide services and assistance as needed during the entire procedure.  I have reviewed and agree with the residents interpretation of the following: lab data.  I have reviewed the following: old records at this facility.                                       Clinical Impression:  Final diagnoses:  [M54.9] Bilateral back pain, unspecified back location, unspecified chronicity (Primary)  [Z3A.34] 34 weeks gestation of pregnancy  [O26.893, R10.2] Pelvic pressure in pregnancy, antepartum, third trimester          ED Disposition Condition    Discharge Stable          ED Prescriptions       Medication Sig Dispense Start Date End Date Auth. Provider    cyclobenzaprine (FLEXERIL) 10 MG tablet (Expires today) Take 1 tablet (10 mg total) by mouth 3 (three) times daily as needed for Muscle spasms. 15 tablet 12/20/2024 12/25/2024 Angelita Menon MD          Follow-up Information    None          Angelita Menon MD  Resident  12/20/24 [id number]       Leanna Armstrong MD  12/25/24 0510

## 2024-12-23 ENCOUNTER — HOSPITAL ENCOUNTER (INPATIENT)
Facility: OTHER | Age: 29
LOS: 3 days | Discharge: HOME | End: 2024-12-26
Attending: OBSTETRICS & GYNECOLOGY | Admitting: OBSTETRICS & GYNECOLOGY
Payer: COMMERCIAL

## 2024-12-23 ENCOUNTER — NURSE TRIAGE (OUTPATIENT)
Dept: ADMINISTRATIVE | Facility: CLINIC | Age: 29
End: 2024-12-23
Payer: COMMERCIAL

## 2024-12-23 ENCOUNTER — ANESTHESIA EVENT (OUTPATIENT)
Dept: OBSTETRICS AND GYNECOLOGY | Facility: OTHER | Age: 29
End: 2024-12-23
Payer: COMMERCIAL

## 2024-12-23 ENCOUNTER — ANESTHESIA (OUTPATIENT)
Dept: OBSTETRICS AND GYNECOLOGY | Facility: OTHER | Age: 29
End: 2024-12-23
Payer: COMMERCIAL

## 2024-12-23 DIAGNOSIS — Z98.891 S/P CESAREAN SECTION: Primary | ICD-10-CM

## 2024-12-23 DIAGNOSIS — Z3A.34 34 WEEKS GESTATION OF PREGNANCY: ICD-10-CM

## 2024-12-23 DIAGNOSIS — O47.9 UTERINE CONTRACTIONS: ICD-10-CM

## 2024-12-23 LAB
ABO + RH BLD: NORMAL
ALBUMIN SERPL BCP-MCNC: 2.6 G/DL (ref 3.5–5.2)
ALP SERPL-CCNC: 109 U/L (ref 40–150)
ALT SERPL W/O P-5'-P-CCNC: 13 U/L (ref 10–44)
ANION GAP SERPL CALC-SCNC: 12 MMOL/L (ref 8–16)
AST SERPL-CCNC: 15 U/L (ref 10–40)
BASOPHILS # BLD AUTO: 0.09 K/UL (ref 0–0.2)
BASOPHILS NFR BLD: 0.7 % (ref 0–1.9)
BILIRUB SERPL-MCNC: 0.2 MG/DL (ref 0.1–1)
BILIRUBIN, POC UA: NEGATIVE
BLD GP AB SCN CELLS X3 SERPL QL: NORMAL
BLOOD, POC UA: NEGATIVE
BUN SERPL-MCNC: 5 MG/DL (ref 6–20)
CALCIUM SERPL-MCNC: 8.6 MG/DL (ref 8.7–10.5)
CHLORIDE SERPL-SCNC: 107 MMOL/L (ref 95–110)
CLARITY, UA: CLEAR
CO2 SERPL-SCNC: 18 MMOL/L (ref 23–29)
COLOR, UA: YELLOW
CREAT SERPL-MCNC: 0.5 MG/DL (ref 0.5–1.4)
CREAT UR-MCNC: 38.7 MG/DL (ref 15–325)
DIFFERENTIAL METHOD BLD: ABNORMAL
EOSINOPHIL # BLD AUTO: 0.2 K/UL (ref 0–0.5)
EOSINOPHIL NFR BLD: 1.3 % (ref 0–8)
ERYTHROCYTE [DISTWIDTH] IN BLOOD BY AUTOMATED COUNT: 13.7 % (ref 11.5–14.5)
EST. GFR  (NO RACE VARIABLE): >60 ML/MIN/1.73 M^2
GLUCOSE SERPL-MCNC: 82 MG/DL (ref 70–110)
GLUCOSE, POC UA: NEGATIVE
HBV SURFACE AG SERPL QL IA: NORMAL
HCT VFR BLD AUTO: 35.1 % (ref 37–48.5)
HGB BLD-MCNC: 11.3 G/DL (ref 12–16)
HIV 1+2 AB+HIV1 P24 AG SERPL QL IA: NEGATIVE
IMM GRANULOCYTES # BLD AUTO: 0.61 K/UL (ref 0–0.04)
IMM GRANULOCYTES NFR BLD AUTO: 4.6 % (ref 0–0.5)
KETONES, POC UA: NEGATIVE
LEUKOCYTE EST, POC UA: NEGATIVE
LYMPHOCYTES # BLD AUTO: 2.2 K/UL (ref 1–4.8)
LYMPHOCYTES NFR BLD: 16.6 % (ref 18–48)
MCH RBC QN AUTO: 25.9 PG (ref 27–31)
MCHC RBC AUTO-ENTMCNC: 32.2 G/DL (ref 32–36)
MCV RBC AUTO: 80 FL (ref 82–98)
MONOCYTES # BLD AUTO: 0.9 K/UL (ref 0.3–1)
MONOCYTES NFR BLD: 7 % (ref 4–15)
NEUTROPHILS # BLD AUTO: 9.3 K/UL (ref 1.8–7.7)
NEUTROPHILS NFR BLD: 69.8 % (ref 38–73)
NITRITE, POC UA: NEGATIVE
NRBC BLD-RTO: 0 /100 WBC
PH UR STRIP: 7.5 [PH] (ref 5–8)
PLATELET # BLD AUTO: 202 K/UL (ref 150–450)
PLATELET BLD QL SMEAR: ABNORMAL
PMV BLD AUTO: 10.4 FL (ref 9.2–12.9)
POTASSIUM SERPL-SCNC: 3.8 MMOL/L (ref 3.5–5.1)
PROT SERPL-MCNC: 5.9 G/DL (ref 6–8.4)
PROT UR-MCNC: 7 MG/DL (ref 0–15)
PROT/CREAT UR: 0.18 MG/G{CREAT} (ref 0–0.2)
PROTEIN, POC UA: NEGATIVE
RBC # BLD AUTO: 4.37 M/UL (ref 4–5.4)
SODIUM SERPL-SCNC: 137 MMOL/L (ref 136–145)
SPECIFIC GRAVITY, POC UA: 1.01 (ref 1–1.03)
SPECIMEN OUTDATE: NORMAL
TREPONEMA PALLIDUM IGG+IGM AB [PRESENCE] IN SERUM OR PLASMA BY IMMUNOASSAY: NONREACTIVE
UROBILINOGEN, POC UA: 0.2 E.U./DL
WBC # BLD AUTO: 13.28 K/UL (ref 3.9–12.7)

## 2024-12-23 PROCEDURE — 25000003 PHARM REV CODE 250

## 2024-12-23 PROCEDURE — 85025 COMPLETE CBC W/AUTO DIFF WBC: CPT

## 2024-12-23 PROCEDURE — 36004725 HC OB OR TIME LEV III - EA ADD 15 MIN: Performed by: OBSTETRICS & GYNECOLOGY

## 2024-12-23 PROCEDURE — 51702 INSERT TEMP BLADDER CATH: CPT

## 2024-12-23 PROCEDURE — 36004724 HC OB OR TIME LEV III - 1ST 15 MIN: Performed by: OBSTETRICS & GYNECOLOGY

## 2024-12-23 PROCEDURE — 63600175 PHARM REV CODE 636 W HCPCS

## 2024-12-23 PROCEDURE — 86920 COMPATIBILITY TEST SPIN: CPT

## 2024-12-23 PROCEDURE — 37000008 HC ANESTHESIA 1ST 15 MINUTES: Performed by: OBSTETRICS & GYNECOLOGY

## 2024-12-23 PROCEDURE — 84156 ASSAY OF PROTEIN URINE: CPT

## 2024-12-23 PROCEDURE — 99284 EMERGENCY DEPT VISIT MOD MDM: CPT | Mod: 25,,, | Performed by: OBSTETRICS & GYNECOLOGY

## 2024-12-23 PROCEDURE — 87389 HIV-1 AG W/HIV-1&-2 AB AG IA: CPT

## 2024-12-23 PROCEDURE — 80053 COMPREHEN METABOLIC PANEL: CPT

## 2024-12-23 PROCEDURE — 62326 NJX INTERLAMINAR LMBR/SAC: CPT | Performed by: ANESTHESIOLOGY

## 2024-12-23 PROCEDURE — 59025 FETAL NON-STRESS TEST: CPT | Mod: 26,,, | Performed by: OBSTETRICS & GYNECOLOGY

## 2024-12-23 PROCEDURE — 86762 RUBELLA ANTIBODY: CPT | Performed by: OBSTETRICS & GYNECOLOGY

## 2024-12-23 PROCEDURE — 88307 TISSUE EXAM BY PATHOLOGIST: CPT | Performed by: PATHOLOGY

## 2024-12-23 PROCEDURE — 36415 COLL VENOUS BLD VENIPUNCTURE: CPT | Performed by: OBSTETRICS & GYNECOLOGY

## 2024-12-23 PROCEDURE — 86593 SYPHILIS TEST NON-TREP QUANT: CPT

## 2024-12-23 PROCEDURE — 71000039 HC RECOVERY, EACH ADD'L HOUR: Performed by: OBSTETRICS & GYNECOLOGY

## 2024-12-23 PROCEDURE — 86850 RBC ANTIBODY SCREEN: CPT

## 2024-12-23 PROCEDURE — 4A0HXCZ MEASUREMENT OF PRODUCTS OF CONCEPTION, CARDIAC RATE, EXTERNAL APPROACH: ICD-10-PCS | Performed by: OBSTETRICS & GYNECOLOGY

## 2024-12-23 PROCEDURE — 71000033 HC RECOVERY, INTIAL HOUR: Performed by: OBSTETRICS & GYNECOLOGY

## 2024-12-23 PROCEDURE — 11000001 HC ACUTE MED/SURG PRIVATE ROOM

## 2024-12-23 PROCEDURE — 87340 HEPATITIS B SURFACE AG IA: CPT | Performed by: OBSTETRICS & GYNECOLOGY

## 2024-12-23 PROCEDURE — 37000009 HC ANESTHESIA EA ADD 15 MINS: Performed by: OBSTETRICS & GYNECOLOGY

## 2024-12-23 RX ORDER — FAMOTIDINE 10 MG/ML
20 INJECTION INTRAVENOUS ONCE
Status: DISCONTINUED | OUTPATIENT
Start: 2024-12-23 | End: 2024-12-23

## 2024-12-23 RX ORDER — SODIUM CHLORIDE, SODIUM LACTATE, POTASSIUM CHLORIDE, CALCIUM CHLORIDE 600; 310; 30; 20 MG/100ML; MG/100ML; MG/100ML; MG/100ML
INJECTION, SOLUTION INTRAVENOUS CONTINUOUS PRN
Status: DISCONTINUED | OUTPATIENT
Start: 2024-12-23 | End: 2024-12-23

## 2024-12-23 RX ORDER — CEFAZOLIN SODIUM 1 G/3ML
INJECTION, POWDER, FOR SOLUTION INTRAMUSCULAR; INTRAVENOUS
Status: DISCONTINUED | OUTPATIENT
Start: 2024-12-23 | End: 2024-12-23

## 2024-12-23 RX ORDER — SODIUM CITRATE AND CITRIC ACID MONOHYDRATE 334; 500 MG/5ML; MG/5ML
30 SOLUTION ORAL ONCE
Status: COMPLETED | OUTPATIENT
Start: 2024-12-23 | End: 2024-12-23

## 2024-12-23 RX ORDER — FENTANYL CITRATE 50 UG/ML
INJECTION, SOLUTION INTRAMUSCULAR; INTRAVENOUS
Status: DISCONTINUED | OUTPATIENT
Start: 2024-12-23 | End: 2024-12-23

## 2024-12-23 RX ORDER — OXYCODONE HYDROCHLORIDE 5 MG/1
5 TABLET ORAL EVERY 4 HOURS PRN
Status: DISCONTINUED | OUTPATIENT
Start: 2024-12-23 | End: 2024-12-23

## 2024-12-23 RX ORDER — DEXAMETHASONE SODIUM PHOSPHATE 4 MG/ML
INJECTION, SOLUTION INTRA-ARTICULAR; INTRALESIONAL; INTRAMUSCULAR; INTRAVENOUS; SOFT TISSUE
Status: DISCONTINUED | OUTPATIENT
Start: 2024-12-23 | End: 2024-12-23

## 2024-12-23 RX ORDER — DIPHENOXYLATE HYDROCHLORIDE AND ATROPINE SULFATE 2.5; .025 MG/1; MG/1
2 TABLET ORAL EVERY 6 HOURS PRN
Status: DISCONTINUED | OUTPATIENT
Start: 2024-12-23 | End: 2024-12-26 | Stop reason: HOSPADM

## 2024-12-23 RX ORDER — ACETAMINOPHEN 325 MG/1
650 TABLET ORAL EVERY 6 HOURS
Status: DISPENSED | OUTPATIENT
Start: 2024-12-23 | End: 2024-12-24

## 2024-12-23 RX ORDER — SODIUM CHLORIDE 0.9 % (FLUSH) 0.9 %
10 SYRINGE (ML) INJECTION
Status: DISCONTINUED | OUTPATIENT
Start: 2024-12-23 | End: 2024-12-26 | Stop reason: HOSPADM

## 2024-12-23 RX ORDER — FAMOTIDINE 10 MG/ML
20 INJECTION INTRAVENOUS ONCE
Status: COMPLETED | OUTPATIENT
Start: 2024-12-23 | End: 2024-12-23

## 2024-12-23 RX ORDER — CLONIDINE 100 UG/ML
INJECTION, SOLUTION EPIDURAL
Status: DISCONTINUED | OUTPATIENT
Start: 2024-12-23 | End: 2024-12-23

## 2024-12-23 RX ORDER — KETOROLAC TROMETHAMINE 30 MG/ML
INJECTION, SOLUTION INTRAMUSCULAR; INTRAVENOUS
Status: DISCONTINUED | OUTPATIENT
Start: 2024-12-23 | End: 2024-12-23

## 2024-12-23 RX ORDER — DIPHENHYDRAMINE HCL 25 MG
25 CAPSULE ORAL EVERY 4 HOURS PRN
Status: DISCONTINUED | OUTPATIENT
Start: 2024-12-23 | End: 2024-12-26 | Stop reason: HOSPADM

## 2024-12-23 RX ORDER — ONDANSETRON HYDROCHLORIDE 2 MG/ML
INJECTION, SOLUTION INTRAVENOUS
Status: DISCONTINUED | OUTPATIENT
Start: 2024-12-23 | End: 2024-12-23

## 2024-12-23 RX ORDER — ACETAMINOPHEN 500 MG
1000 TABLET ORAL ONCE
Status: COMPLETED | OUTPATIENT
Start: 2024-12-23 | End: 2024-12-23

## 2024-12-23 RX ORDER — OXYCODONE AND ACETAMINOPHEN 5; 325 MG/1; MG/1
1 TABLET ORAL EVERY 4 HOURS PRN
Status: DISCONTINUED | OUTPATIENT
Start: 2024-12-23 | End: 2024-12-25

## 2024-12-23 RX ORDER — SODIUM CITRATE AND CITRIC ACID MONOHYDRATE 334; 500 MG/5ML; MG/5ML
30 SOLUTION ORAL ONCE
Status: DISCONTINUED | OUTPATIENT
Start: 2024-12-23 | End: 2024-12-23

## 2024-12-23 RX ORDER — ONDANSETRON HYDROCHLORIDE 2 MG/ML
4 INJECTION, SOLUTION INTRAVENOUS EVERY 6 HOURS PRN
Status: ACTIVE | OUTPATIENT
Start: 2024-12-23 | End: 2024-12-24

## 2024-12-23 RX ORDER — ADHESIVE BANDAGE
30 BANDAGE TOPICAL 2 TIMES DAILY PRN
Status: DISCONTINUED | OUTPATIENT
Start: 2024-12-24 | End: 2024-12-26 | Stop reason: HOSPADM

## 2024-12-23 RX ORDER — TRANEXAMIC ACID 10 MG/ML
1000 INJECTION, SOLUTION INTRAVENOUS EVERY 30 MIN PRN
Status: DISCONTINUED | OUTPATIENT
Start: 2024-12-23 | End: 2024-12-26 | Stop reason: HOSPADM

## 2024-12-23 RX ORDER — KETOROLAC TROMETHAMINE 30 MG/ML
30 INJECTION, SOLUTION INTRAMUSCULAR; INTRAVENOUS EVERY 6 HOURS
Status: COMPLETED | OUTPATIENT
Start: 2024-12-23 | End: 2024-12-24

## 2024-12-23 RX ORDER — AMOXICILLIN 250 MG
1 CAPSULE ORAL NIGHTLY PRN
Status: DISCONTINUED | OUTPATIENT
Start: 2024-12-23 | End: 2024-12-26 | Stop reason: HOSPADM

## 2024-12-23 RX ORDER — DOCUSATE SODIUM 100 MG/1
200 CAPSULE, LIQUID FILLED ORAL 2 TIMES DAILY
Status: DISCONTINUED | OUTPATIENT
Start: 2024-12-23 | End: 2024-12-26 | Stop reason: HOSPADM

## 2024-12-23 RX ORDER — OXYTOCIN 10 [USP'U]/ML
INJECTION, SOLUTION INTRAMUSCULAR; INTRAVENOUS
Status: DISCONTINUED | OUTPATIENT
Start: 2024-12-23 | End: 2024-12-23

## 2024-12-23 RX ORDER — OXYTOCIN-SODIUM CHLORIDE 0.9% IV SOLN 30 UNIT/500ML 30-0.9/5 UT/ML-%
95 SOLUTION INTRAVENOUS CONTINUOUS PRN
Status: DISCONTINUED | OUTPATIENT
Start: 2024-12-23 | End: 2024-12-26 | Stop reason: HOSPADM

## 2024-12-23 RX ORDER — CHLOROPROCAINE HYDROCHLORIDE 20 MG/ML
INJECTION, SOLUTION EPIDURAL; INFILTRATION; INTRACAUDAL; PERINEURAL
Status: DISCONTINUED | OUTPATIENT
Start: 2024-12-23 | End: 2024-12-23

## 2024-12-23 RX ORDER — PRENATAL WITH FERROUS FUM AND FOLIC ACID 3080; 920; 120; 400; 22; 1.84; 3; 20; 10; 1; 12; 200; 27; 25; 2 [IU]/1; [IU]/1; MG/1; [IU]/1; MG/1; MG/1; MG/1; MG/1; MG/1; MG/1; UG/1; MG/1; MG/1; MG/1; MG/1
1 TABLET ORAL DAILY
Status: DISCONTINUED | OUTPATIENT
Start: 2024-12-23 | End: 2024-12-26 | Stop reason: HOSPADM

## 2024-12-23 RX ORDER — ONDANSETRON 8 MG/1
8 TABLET, ORALLY DISINTEGRATING ORAL EVERY 8 HOURS PRN
Status: DISCONTINUED | OUTPATIENT
Start: 2024-12-23 | End: 2024-12-26 | Stop reason: HOSPADM

## 2024-12-23 RX ORDER — BUPIVACAINE HYDROCHLORIDE 7.5 MG/ML
INJECTION, SOLUTION INTRASPINAL
Status: DISCONTINUED | OUTPATIENT
Start: 2024-12-23 | End: 2024-12-23

## 2024-12-23 RX ORDER — OXYCODONE HYDROCHLORIDE 10 MG/1
10 TABLET ORAL EVERY 4 HOURS PRN
Status: DISCONTINUED | OUTPATIENT
Start: 2024-12-23 | End: 2024-12-23

## 2024-12-23 RX ORDER — MISOPROSTOL 200 UG/1
800 TABLET ORAL ONCE AS NEEDED
Status: DISCONTINUED | OUTPATIENT
Start: 2024-12-23 | End: 2024-12-26 | Stop reason: HOSPADM

## 2024-12-23 RX ORDER — MORPHINE SULFATE 0.5 MG/ML
INJECTION, SOLUTION EPIDURAL; INTRATHECAL; INTRAVENOUS
Status: DISCONTINUED | OUTPATIENT
Start: 2024-12-23 | End: 2024-12-23

## 2024-12-23 RX ORDER — TRAZODONE HYDROCHLORIDE 50 MG/1
50 TABLET ORAL NIGHTLY
Status: DISCONTINUED | OUTPATIENT
Start: 2024-12-23 | End: 2024-12-26 | Stop reason: HOSPADM

## 2024-12-23 RX ORDER — SIMETHICONE 80 MG
1 TABLET,CHEWABLE ORAL EVERY 6 HOURS PRN
Status: DISCONTINUED | OUTPATIENT
Start: 2024-12-23 | End: 2024-12-26 | Stop reason: HOSPADM

## 2024-12-23 RX ORDER — PROCHLORPERAZINE EDISYLATE 5 MG/ML
5 INJECTION INTRAMUSCULAR; INTRAVENOUS EVERY 6 HOURS PRN
Status: DISCONTINUED | OUTPATIENT
Start: 2024-12-23 | End: 2024-12-26 | Stop reason: HOSPADM

## 2024-12-23 RX ORDER — HYDROMORPHONE HYDROCHLORIDE 1 MG/ML
0.5 INJECTION, SOLUTION INTRAMUSCULAR; INTRAVENOUS; SUBCUTANEOUS ONCE
Status: COMPLETED | OUTPATIENT
Start: 2024-12-23 | End: 2024-12-23

## 2024-12-23 RX ORDER — ACETAMINOPHEN 500 MG
1000 TABLET ORAL ONCE
Status: DISCONTINUED | OUTPATIENT
Start: 2024-12-23 | End: 2024-12-23

## 2024-12-23 RX ORDER — BISACODYL 10 MG/1
10 SUPPOSITORY RECTAL ONCE AS NEEDED
Status: DISCONTINUED | OUTPATIENT
Start: 2024-12-23 | End: 2024-12-26 | Stop reason: HOSPADM

## 2024-12-23 RX ORDER — OXYTOCIN-SODIUM CHLORIDE 0.9% IV SOLN 30 UNIT/500ML 30-0.9/5 UT/ML-%
95 SOLUTION INTRAVENOUS ONCE AS NEEDED
Status: DISCONTINUED | OUTPATIENT
Start: 2024-12-23 | End: 2024-12-26 | Stop reason: HOSPADM

## 2024-12-23 RX ORDER — OXYTOCIN-SODIUM CHLORIDE 0.9% IV SOLN 30 UNIT/500ML 30-0.9/5 UT/ML-%
30 SOLUTION INTRAVENOUS ONCE AS NEEDED
Status: DISCONTINUED | OUTPATIENT
Start: 2024-12-23 | End: 2024-12-26 | Stop reason: HOSPADM

## 2024-12-23 RX ORDER — OXYTOCIN 10 [USP'U]/ML
10 INJECTION, SOLUTION INTRAMUSCULAR; INTRAVENOUS ONCE AS NEEDED
Status: DISCONTINUED | OUTPATIENT
Start: 2024-12-23 | End: 2024-12-26 | Stop reason: HOSPADM

## 2024-12-23 RX ORDER — HYDROCODONE BITARTRATE AND ACETAMINOPHEN 500; 5 MG/1; MG/1
TABLET ORAL
Status: DISCONTINUED | OUTPATIENT
Start: 2024-12-23 | End: 2024-12-23

## 2024-12-23 RX ORDER — VENLAFAXINE HYDROCHLORIDE 37.5 MG/1
37.5 CAPSULE, EXTENDED RELEASE ORAL DAILY
Status: DISCONTINUED | OUTPATIENT
Start: 2024-12-23 | End: 2024-12-26 | Stop reason: HOSPADM

## 2024-12-23 RX ORDER — OXYCODONE AND ACETAMINOPHEN 10; 325 MG/1; MG/1
1 TABLET ORAL EVERY 4 HOURS PRN
Status: DISCONTINUED | OUTPATIENT
Start: 2024-12-23 | End: 2024-12-25

## 2024-12-23 RX ORDER — CARBOPROST TROMETHAMINE 250 UG/ML
250 INJECTION, SOLUTION INTRAMUSCULAR
Status: DISCONTINUED | OUTPATIENT
Start: 2024-12-23 | End: 2024-12-26 | Stop reason: HOSPADM

## 2024-12-23 RX ORDER — METHYLERGONOVINE MALEATE 0.2 MG/ML
200 INJECTION INTRAVENOUS ONCE AS NEEDED
Status: DISCONTINUED | OUTPATIENT
Start: 2024-12-23 | End: 2024-12-26 | Stop reason: HOSPADM

## 2024-12-23 RX ADMIN — Medication 0.1 MG: at 08:12

## 2024-12-23 RX ADMIN — OXYTOCIN 3 UNITS: 10 INJECTION, SOLUTION INTRAMUSCULAR; INTRAVENOUS at 09:12

## 2024-12-23 RX ADMIN — ONDANSETRON 4 MG: 2 INJECTION INTRAMUSCULAR; INTRAVENOUS at 08:12

## 2024-12-23 RX ADMIN — OXYCODONE AND ACETAMINOPHEN 1 TABLET: 10; 325 TABLET ORAL at 11:12

## 2024-12-23 RX ADMIN — BUPIVACAINE HYDROCHLORIDE IN DEXTROSE 1.6 ML: 7.5 INJECTION, SOLUTION SUBARACHNOID at 08:12

## 2024-12-23 RX ADMIN — ACETAMINOPHEN 650 MG: 325 TABLET, FILM COATED ORAL at 08:12

## 2024-12-23 RX ADMIN — PROCHLORPERAZINE EDISYLATE 5 MG: 5 INJECTION INTRAMUSCULAR; INTRAVENOUS at 11:12

## 2024-12-23 RX ADMIN — SODIUM CITRATE AND CITRIC ACID MONOHYDRATE 30 ML: 500; 334 SOLUTION ORAL at 08:12

## 2024-12-23 RX ADMIN — FENTANYL CITRATE 10 MCG: 50 INJECTION, SOLUTION INTRAMUSCULAR; INTRAVENOUS at 08:12

## 2024-12-23 RX ADMIN — OXYTOCIN 12 UNITS: 10 INJECTION, SOLUTION INTRAMUSCULAR; INTRAVENOUS at 09:12

## 2024-12-23 RX ADMIN — DEXAMETHASONE SODIUM PHOSPHATE 4 MG: 4 INJECTION, SOLUTION INTRAMUSCULAR; INTRAVENOUS at 08:12

## 2024-12-23 RX ADMIN — KETOROLAC TROMETHAMINE 30 MG: 30 INJECTION, SOLUTION INTRAMUSCULAR; INTRAVENOUS at 03:12

## 2024-12-23 RX ADMIN — FAMOTIDINE 20 MG: 10 INJECTION, SOLUTION INTRAVENOUS at 08:12

## 2024-12-23 RX ADMIN — SODIUM CHLORIDE, SODIUM LACTATE, POTASSIUM CHLORIDE, AND CALCIUM CHLORIDE: 600; 310; 30; 20 INJECTION, SOLUTION INTRAVENOUS at 08:12

## 2024-12-23 RX ADMIN — FENTANYL CITRATE 90 MCG: 50 INJECTION, SOLUTION INTRAMUSCULAR; INTRAVENOUS at 09:12

## 2024-12-23 RX ADMIN — KETOROLAC TROMETHAMINE 30 MG: 30 INJECTION, SOLUTION INTRAMUSCULAR; INTRAVENOUS at 10:12

## 2024-12-23 RX ADMIN — CEFAZOLIN 2 G: 330 INJECTION, POWDER, FOR SOLUTION INTRAMUSCULAR; INTRAVENOUS at 08:12

## 2024-12-23 RX ADMIN — CHLOROPROCAINE HYDROCHLORIDE 10 ML: 20 INJECTION, SOLUTION EPIDURAL; INFILTRATION; INTRACAUDAL; PERINEURAL at 09:12

## 2024-12-23 RX ADMIN — KETOROLAC TROMETHAMINE 30 MG: 30 INJECTION, SOLUTION INTRAMUSCULAR; INTRAVENOUS at 08:12

## 2024-12-23 RX ADMIN — Medication 0.5 MG: at 10:12

## 2024-12-23 RX ADMIN — HYDROMORPHONE HYDROCHLORIDE 0.5 MG: 0.5 INJECTION, SOLUTION INTRAMUSCULAR; INTRAVENOUS; SUBCUTANEOUS at 12:12

## 2024-12-23 RX ADMIN — ACETAMINOPHEN 1000 MG: 500 TABLET, FILM COATED ORAL at 08:12

## 2024-12-23 RX ADMIN — ACETAMINOPHEN 650 MG: 325 TABLET, FILM COATED ORAL at 02:12

## 2024-12-23 RX ADMIN — CLONIDINE HYDROCHLORIDE 150 MCG: 100 INJECTION, SOLUTION INTRAVENOUS at 09:12

## 2024-12-23 RX ADMIN — SODIUM CHLORIDE, POTASSIUM CHLORIDE, SODIUM LACTATE AND CALCIUM CHLORIDE 500 ML: 600; 310; 30; 20 INJECTION, SOLUTION INTRAVENOUS at 08:12

## 2024-12-23 RX ADMIN — DOCUSATE SODIUM 200 MG: 100 CAPSULE, LIQUID FILLED ORAL at 08:12

## 2024-12-23 RX ADMIN — OXYCODONE AND ACETAMINOPHEN 1 TABLET: 10; 325 TABLET ORAL at 03:12

## 2024-12-23 NOTE — TRANSFER OF CARE
"Anesthesia Transfer of Care Note    Patient: Annemarie Gupta    Procedure(s) Performed: Procedure(s) (LRB):   SECTION (N/A)    Patient location: Labor and Delivery    Anesthesia Type: epidural and spinal    Transport from OR: Transported from OR on room air with adequate spontaneous ventilation    Post pain: adequate analgesia    Post assessment: no apparent anesthetic complications and tolerated procedure well    Post vital signs: stable    Level of consciousness: awake, alert and oriented    Nausea/Vomiting: no nausea/vomiting    Complications: none    Transfer of care protocol was followed      Last vitals: Visit Vitals  BP (!) 141/98   Pulse 108   Temp 36.6 °C (97.9 °F) (Oral)   Resp 18   Ht 5' 8" (1.727 m)   Wt 96.6 kg (212 lb 15.4 oz)   LMP 04/15/2024 (Approximate)   SpO2 98%   Breastfeeding No   BMI 32.38 kg/m²     "

## 2024-12-23 NOTE — ANESTHESIA PREPROCEDURE EVALUATION
[facility]  Anesthesia Pre-Operative Evaluation         Patient Name: Annemarie Gupta  YOB: 1995  MRN: [id number]    2024      Annemarie Gupta is a 29 y.o. female  @ 34w6d who presents for LTCS 2/2 vasa previa. 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 No hx of coagulopathy or spine disorders    OB History    Para Term  AB Living   1             SAB IAB Ectopic Multiple Live Births                  # Outcome Date GA Lbr Reilly/2nd Weight Sex Type Anes PTL Lv   1 Current                Review of patient's allergies indicates:  No Known Allergies    Wt Readings from Last 1 Encounters:   24 1438 96.6 kg (212 lb 13.7 oz)       BP Readings from Last 3 Encounters:   24 (!) 138/93   24 (!) 147/97   24 (!) 145/92       Patient Active Problem List   Diagnosis    Vasa previa    Subchorionic hemorrhage of placenta in second trimester    Cystic fibrosis carrier    Anxiety during pregnancy    Gestational hypertension, third trimester       Past Surgical History:   Procedure Laterality Date    INGUINAL HERNIA REPAIR         Social History     Socioeconomic History    Marital status:    Tobacco Use    Smoking status: Never    Smokeless tobacco: Never   Substance and Sexual Activity    Alcohol use: No    Drug use: Never    Sexual activity: Yes     Partners: Male     Birth control/protection: None   Social History Narrative    ** Merged History Encounter **          Social Drivers of Health     Financial Resource Strain: Low Risk  (2024)    Received from [facility]    Overall Financial Resource Strain (CARDIA)     Difficulty of Paying Living Expenses: Not hard at all   Food Insecurity: No Food Insecurity (2024)    Received from [facility]    Hunger Vital Sign     Worried About Running Out of Food in the Last Year: Never true     Ran Out of Food in the Last Year: Never true   Transportation Needs: No Transportation  "Needs (6/25/2024)    Received from [facility]    PRAPARE - Transportation     Lack of Transportation (Medical): No     Lack of Transportation (Non-Medical): No   Physical Activity: Insufficiently Active (6/25/2024)    Received from [facility]    Exercise Vital Sign     Days of Exercise per Week: 2 days     Minutes of Exercise per Session: 40 min   Stress: No Stress Concern Present (6/25/2024)    Received from [facility]    [employer] - Occupational Stress Questionnaire     Feeling of Stress : Only a little         Chemistry        Component Value Date/Time     12/20/2024 1630    K 3.4 (L) 12/20/2024 1630     12/20/2024 1630    CO2 18 (L) 12/20/2024 1630    BUN 5 (L) 12/20/2024 1630    CREATININE 0.6 12/20/2024 1630     (H) 12/20/2024 1630        Component Value Date/Time    CALCIUM 8.9 12/20/2024 1630    ALKPHOS 114 12/20/2024 1630    AST 11 12/20/2024 1630    ALT 13 12/20/2024 1630    BILITOT 0.1 12/20/2024 1630    ESTGFRAFRICA >105 03/16/2020 0711    ESTGFRAFRICA >60 02/26/2011 0946    EGFRNONAA >60 02/26/2011 0946            Lab Results   Component Value Date    WBC 13.89 (H) 12/20/2024    HGB 10.7 (L) 12/20/2024    HCT 32.6 (L) 12/20/2024    MCV 81 (L) 12/20/2024     12/20/2024       No results for input(s): "PT", "INR", "PROTIME", "APTT" in the last 72 hours.            Pre-op Assessment    I have reviewed the Patient Summary Reports.     I have reviewed the Nursing Notes. I have reviewed the NPO Status.   I have reviewed the Medications.     Review of Systems  Anesthesia Hx:   Neg history of prior surgery.          Denies Family Hx of Anesthesia complications.    Denies Personal Hx of Anesthesia complications.                    Social:  Non-Smoker, No Alcohol Use       Cardiovascular:     Hypertension, well controlled   Denies MI.  Denies CAD.       Denies Angina.  Denies CHF.                                   Pulmonary:    Denies COPD.  Denies " Asthma.                    Hepatic/GI:     GERD Denies Liver Disease.               Neurological:    Denies CVA.    Denies Seizures.                                Endocrine:  Denies Diabetes.           Psych:  Psychiatric History anxiety                 Physical Exam  General: Well nourished, Cooperative and Alert    Airway:  Mallampati: III / II  Mouth Opening: Normal  TM Distance: Normal  Tongue: Normal  Neck ROM: Normal ROM    Dental:  Intact    Chest/Lungs:  Normal Respiratory Rate    Heart:  Rate: Normal        Anesthesia Plan  Type of Anesthesia, risks & benefits discussed:    Anesthesia Type: Gen ETT, Spinal, CSE  Intra-op Monitoring Plan: Standard ASA Monitors  Post Op Pain Control Plan: multimodal analgesia  Induction:  IV  Airway Plan: Direct, Post-Induction  Informed Consent: Informed consent signed with the Patient and all parties understand the risks and agree with anesthesia plan.  All questions answered.   ASA Score: 3  Day of Surgery Review of History & Physical: H&P Update referred to the surgeon/provider.    Ready For Surgery From Anesthesia Perspective.     .

## 2024-12-23 NOTE — L&D DELIVERY NOTE
[facility] - Labor & Delivery   Section   Operative Note    ____________________________________________________________  Fellow Attestation      I have reviewed and concur with the resident's operative report. My edits have been incorporated.     Hysterotomy made slightly higher than usual to avoid fetal vessels, however still within the lower uterine segment. Easily closed in a single layer. She is a candidate for TOLAC should she desire.     JAKOB Armendariz MD  Maternal Fetal Medicine Fellow   PGY-6      SUMMARY     Date of Procedure: 2024     Procedure: Procedure(s) (LRB):   SECTION (N/A)    Surgeons and Role:     * Sherly Luis MD - Primary     * Sterling Wheatley MD - Resident - Assisting     * Dario Armendariz MD    Pre-op diagnosis  Vasa previa with regular uterine contractions.  Low lying, anterior placenta  Mood disorder  GERD  gHTN  anemia    Findings:   Female infant delivered via low transverse caesarian section. Weight 3130g, APGARS 9/9. Low anterior placenta, normal appearance. Uterus, tubes and ovaries were normal. PPH from placental location, bleeding rapidly controlled after delivery.     Estimated Blood Loss:  1925 mL           Total IV Fluids: see anesthesia note     UOP: see anesthesia note    Procedure Details   Prior to the procedure, the risks, benefits, complications, and expected outcomes were discussed with the patient.  Consents in the chart. The patient was taken to Operating Room, identified as Annemarie Gupta and the procedure verified as primary  for vasa previa. A Time Out was held and the above information confirmed.     Bedside US performed to confirm position of the placenta and vasa previa for surgical planning. After induction and confirmation of anesthesia, the patient was prepped and draped in the usual sterile manner while placed in a dorsal supine position. A davenport catheter was also placed per nursing. Preoperative antibiotics Ancef 2 g  administered.     An allis test was performed to confirm adequate anesthesia.  A Pfannenstiel skin incision was made and carried down through the subcutaneous tissue to the fascia. Fascial incision was made and extended transversely using love scissors. The fascia was grasped with [facility] clamps and  from the underlying rectus muscle superiorly and inferiorly. The peritoneum was identified, found to be free of adherent bowel, and entered bluntly. The peritoneal incision was extended bluntly and laterally. The bladder blade was inserted. A transverse uterine incision (2-3 cm below the round ligaments) was made sharply and extended with cephalocaudal traction. Anterior placenta transected upon entry to the hysterotomy with brisk bleeding. The infant was noted to be in cephalic presentation. The head was rapidly brought to the incision and elevated out of the pelvis. The patient delivered a single viable female infant without difficulty.  See APGARS and weight in findings. Umbilical cord was immediately clamped and cut. The placenta was removed intact and appeared normal. Bleeding was controlled at this time. The uterus was exteriorized and noted to have excellent tone. The uterine outline, tubes and ovaries appeared normal. The hysterotomy was closed with running locked sutures of 1 chromic. Excellent hemostasis was observed. The uterus was then returned to the abdominal cavity. Incision was reinspected and good hemostasis was noted. The abdominal cavity was cleaned with laps to remove clots. The subfascial structures were evaluated and excellent hemostasis noted. A 1 cm fascial defect was closed with 1 vicryl. The fascia was then reapproximated with running sutures of 1 PDS starting at both angles and tying in the middle. The subcutaneous tissue was irrigated and excellent hemostasis noted, subcutaneus fat was reapproximated with 2-0 vicryl, and skin was reapproximated with 4-0 monocryl in a subcuticular  "fashion.     Instrument, sponge, and needle counts were correct prior the abdominal closure and at the conclusion of the case.     The patient tolerated procedure well and was taken to the recovery room in stable condition after the procedure.           Delivery Information for Marlee Gupta    Birth information:  YOB: 2026   Time of birth: 9:28 AM   Sex: female   Head Delivery Date/Time: 2024  9:28 AM   Delivery type: , Low Transverse   Gestational Age: 34w6d       Delivery Providers    Delivering clinician: Sherly Luis MD   Provider Role    Dario Armendariz MD Neira Gesteira, Andrea, MD Soileau, Madeleine, RN     Zenaida, ST [place]               Measurements    Weight: 3130 g  Weight (lbs): 6 lb 14.4 oz  Length: 46.2 cm  Length (in): 18.19"  Head circumference: 33.7 cm         Apgars    Living status: Living  Apgar Component Scores:  1 min.:  5 min.:  10 min.:  15 min.:  20 min.:    Skin color:  1  1       Heart rate:  2  2       Reflex irritability:  2  2       Muscle tone:  2  2       Respiratory effort:  2  2       Total:  9  9       Apgars assigned by: NICU         Operative Delivery    Forceps attempted?: No  Vacuum extractor attempted?: No         Shoulder Dystocia    Shoulder dystocia present?: No           Presentation    Presentation: Vertex           Interventions/Resuscitation    Method: NICU Attended       Cord    Vessels: 3 vessels  Complications: Body  Cord Around: shoulders  Number of Loops: 1  Delayed Cord Clamping?: No  Cord Clamped Date/Time: 2024  9:28 AM  Cord Blood Disposition: Discarded  Gases Sent?: No  Stem Cell Collection (by MD): No       Placenta    Placenta delivery date/time: 2024 0929  Placenta removal: Manual removal  Placenta appearance: Intact  Placenta disposition: Pathology           Labor Events:       labor: No     Labor Onset Date/Time:         Dilation Complete Date/Time:         Start Pushing Date/Time:     "     Start Pushing Date/Time:       Rupture Date/Time:            Rupture type:          Fluid Amount:       Fluid Color:                steroids: Full Course     Antibiotics given for GBS:       Induction: none     Indications for induction:        Augmentation:       Indications for augmentation:       Labor complications: Placenta Previa     Additional complications: Vasa Previa        Cervical ripening:                     Delivery:      Episiotomy:       Indication for Episiotomy:       Perineal Lacerations:   Repaired:      Periurethral Laceration:   Repaired:     Labial Laceration:   Repaired:     Sulcus Laceration:   Repaired:     Vaginal Laceration:   Repaired:     Cervical Laceration:   Repaired:     Repair suture:       Repair # of packets:       Last Value - EBL - Nursing (mL):       Sum - EBL - Nursing (mL): 0     Last Value - EBL - Anesthesia (mL):      Calculated QBL (mL):      Running total QBL (mL):      Vaginal Sweep Performed:       Surgicount Correct:       Vaginal Packing:   Quantity:       Other providers:       Anesthesia    Method: Spinal, Epidural          Details (if applicable):  Trial of Labor No    Categorization: Primary    Priority: Urgent   Indications for : Placenta Previa   Incision Type: low transverse     Additional  information:  Forceps:    Vacuum:    Breech:    Observed anomalies    Other (Comments):         Sterling Brewer MD  PGY-2

## 2024-12-23 NOTE — ANESTHESIA PROCEDURE NOTES
Spinal    Diagnosis: IUP  Patient location during procedure: OR  Start time: 12/23/2024 8:47 AM  Timeout: 12/23/2024 8:46 AM  End time: 12/23/2024 8:53 AM    Staffing  Authorizing Provider: Sanjuanita Montesinos MD  Performing Provider: Solo Moreno MD    Staffing  Performed by: Solo Moreno MD  Authorized by: Sanjuanita Montesinos MD    Preanesthetic Checklist  Completed: patient identified, IV checked, site marked, risks and benefits discussed, surgical consent, monitors and equipment checked, pre-op evaluation and timeout performed  Spinal Block  Patient position: sitting  Prep: ChloraPrep  Patient monitoring: heart rate, continuous pulse ox and frequent blood pressure checks  Approach: midline  Injection technique: single shot  CSF Fluid: clear free-flowing CSF  Needle  Needle type: Shweta   Needle gauge: 25 G  Needle length: 3.5 in  Additional Documentation: incremental injection, negative aspiration for heme and no paresthesia on injection  Needle localization: anatomical landmarks  Assessment  Ease of block: easy  Patient's tolerance of the procedure: comfortable throughout block

## 2024-12-23 NOTE — ED PROVIDER NOTES
Encounter Date: 2024       History     Chief Complaint   Patient presents with    Rupture of Membranes    loss of mucous plug     HPI  Annemarie Gupta is a 29 y.o.  female with IUP at 34w6d weeks gestation with a vasa previa who presents with possible ROM. The patient reports that she had increased discharge yesterday and then more this morning when she woke up. Patient denies feeling contractions, denies vaginal bleeding, reports LOF.   Fetal Movement: normal.     This IUP is complicated by gHTN, vasa previa, anemia, and mood disorder.    Review of patient's allergies indicates:  No Known Allergies  Past Medical History:   Diagnosis Date    Strep throat      Past Surgical History:   Procedure Laterality Date    INGUINAL HERNIA REPAIR       Family History   Problem Relation Name Age of Onset    Skin cancer Mother       Social History     Tobacco Use    Smoking status: Never    Smokeless tobacco: Never   Substance Use Topics    Alcohol use: No    Drug use: Never     Review of Systems   Constitutional:  Negative for chills, fatigue and fever.   HENT:  Negative for congestion.    Eyes:  Negative for photophobia and visual disturbance.   Respiratory:  Negative for chest tightness and shortness of breath.    Cardiovascular:  Negative for chest pain.   Gastrointestinal:  Negative for abdominal pain, constipation, diarrhea, nausea and vomiting.   Genitourinary:  Positive for vaginal discharge. Negative for flank pain, pelvic pain and urgency.   Musculoskeletal:  Negative for back pain.   Neurological:  Negative for weakness and headaches.   Psychiatric/Behavioral:  Negative for behavioral problems, confusion, decreased concentration, dysphoric mood and hallucinations.        Physical Exam     Initial Vitals   BP Pulse Resp Temp SpO2   24 0709 24 0708 24 0708 24 0709 24 0708   (!) 159/104 103 18 97.9 °F (36.6 °C) 99 %      MAP       --                Physical Exam    Vitals  reviewed.  Constitutional: She appears well-developed and well-nourished. She is not diaphoretic. No distress.   HENT:   Head: Normocephalic and atraumatic.   Eyes: EOM are normal.   Neck:   Normal range of motion.  Cardiovascular:  Normal rate.           Pulmonary/Chest: No respiratory distress. She exhibits no tenderness.   Abdominal: Abdomen is soft. There is no abdominal tenderness. There is no rebound and no guarding.   Musculoskeletal:         General: Normal range of motion.      Cervical back: Normal range of motion.     Neurological: She is alert and oriented to person, place, and time.   Skin: Skin is warm and dry.   Psychiatric: She has a normal mood and affect. Thought content normal.         ED Course   Fetal non-stress test    Date/Time: 12/23/2024 7:16 AM    Performed by: Monet Dobbs MD  Authorized by: Sherly Luis MD    Nonstress Test:     Variability:  6-25 BPM    Decelerations:  None    Accelerations:  15 bpm    Baseline:  150    Contractions:  Regular    Contraction Frequency:  2 minutes  Biophysical Profile:     Nonstress Test Interpretation: reactive      Overall Impression:  Reassuring    Labs Reviewed   COMPREHENSIVE METABOLIC PANEL - Abnormal       Result Value    Sodium 137      Potassium 3.8      Chloride 107      CO2 18 (*)     Glucose 82      BUN 5 (*)     Creatinine 0.5      Calcium 8.6 (*)     Total Protein 5.9 (*)     Albumin 2.6 (*)     Total Bilirubin 0.2      Alkaline Phosphatase 109      AST 15      ALT 13      eGFR >60      Anion Gap 12     PROTEIN / CREATININE RATIO, URINE    Protein, Urine Random 7      Creatinine, Urine 38.7      Prot/Creat Ratio, Urine 0.18      Narrative:     Specimen Source->Urine   TYPE & SCREEN    Group & Rh A POS      Indirect Marsha NEG      Specimen Outdate 12/26/2024 23:59     POCT URINALYSIS W/O SCOPE    Spec Grav UA 1.015      PH, UA 7.5      Protein, UA Negative      Glucose, UA Negative      Ketones, UA Negative      Bilirubin, UA  Negative      Blood, UA Negative      Leukocytes, UA Negative      Nitrite, UA Negative      Urobilinogen, UA 0.2      Color, UA POC Yellow      Clarity, UA, POC Clear            Imaging Results    None          Medications   acetaminophen tablet 650 mg (has no administration in time range)   ketorolac injection 30 mg (has no administration in time range)   ondansetron injection 4 mg (has no administration in time range)   prochlorperazine injection Soln 5 mg (5 mg Intravenous Given 12/23/24 1114)   traZODone tablet 50 mg (has no administration in time range)   venlafaxine 24 hr capsule 37.5 mg (has no administration in time range)   sodium chloride 0.9% flush 10 mL (has no administration in time range)   oxytocin 30 units/500 mL (60 milliunits/mL) in 0.9% NaCl (non-titrating) (has no administration in time range)   oxyCODONE-acetaminophen 5-325 mg per tablet 1 tablet (has no administration in time range)   oxyCODONE-acetaminophen  mg per tablet 1 tablet (1 tablet Oral Given 12/23/24 1142)   lanolin cream (has no administration in time range)   ondansetron disintegrating tablet 8 mg (has no administration in time range)   senna-docusate 8.6-50 mg per tablet 1 tablet (has no administration in time range)   docusate sodium capsule 200 mg (has no administration in time range)   simethicone chewable tablet 80 mg (has no administration in time range)   diphenhydrAMINE capsule 25 mg (has no administration in time range)   prenatal vitamin oral tablet (has no administration in time range)   magnesium hydroxide 400 mg/5 ml suspension 2,400 mg (has no administration in time range)   bisacodyL suppository 10 mg (has no administration in time range)   measles, mumps and rubella vaccine 1,000-12,500 TCID50/0.5 mL injection 0.5 mL (has no administration in time range)   Tdap (BOOSTRIX) vaccine injection 0.5 mL (has no administration in time range)   oxytocin 30 units/500 mL (60 milliunits/mL) in 0.9% NaCl IV bolus from bag  (has no administration in time range)   oxytocin 30 units/500 mL (60 milliunits/mL) in 0.9% NaCl (non-titrating) (has no administration in time range)   oxytocin injection 10 Units (has no administration in time range)   miSOPROStoL tablet 800 mcg (has no administration in time range)   miSOPROStoL tablet 800 mcg (has no administration in time range)   methylergonovine injection 200 mcg (has no administration in time range)   carboprost injection 250 mcg (has no administration in time range)   tranexamic acid in NaCl,iso-os IVPB 1,000 mg (has no administration in time range)   diphenoxylate-atropine 2.5-0.025 mg per tablet 2 tablet (has no administration in time range)   lactated ringers bolus 500 mL (500 mLs Intravenous New Bag 12/23/24 0816)   acetaminophen tablet 1,000 mg (1,000 mg Oral Given 12/23/24 0832)   famotidine (PF) injection 20 mg (20 mg Intravenous Given 12/23/24 0832)   sodium citrate-citric acid 500-334 mg/5 ml solution 30 mL (30 mLs Oral Given 12/23/24 0832)     Medical Decision Making  Leakage of fluid  - Negative pooling, valsalva, nitrazine, ferning  - SSE: closed and ROM negative   - MVP  6    Contractions  - SSE closed   - Contractions regular on the tocometer         - Not feeling contractions  - Discussed with MFM will proceed with pLTCS today  - Fetus cephalic  - Consents signed and all questions answered  - She voiced understanding and is in agreement with the plan    gHTN  - Mild range BP in the SAMUEL  - preE labs as above  - Will trend BPs    Amount and/or Complexity of Data Reviewed  Labs: ordered.    Risk  Prescription drug management.              Attending Attestation:   Physician Attestation Statement for Resident:  As the supervising MD   Physician Attestation Statement: I have personally seen and examined this patient.   I agree with the above history.  -:   As the supervising MD I agree with the above PE.     As the supervising MD I agree with the above treatment, course, plan, and  disposition.   -:     NST reactive and reassuring, toco with regular contractions.  Exam not c/w SROM.  Will admit to L&D for C/S due to vasa previa with regular contractions.  Consents signed.    Monet Dobbs MD,JENNA  Date and Time: 12/23/2024 12:09 PM  OB Hospitalist   I was personally present during the critical portions of the procedure(s) performed by the resident and was immediately available in the ED to provide services and assistance as needed during the entire procedure.   I have reviewed the following: old records at this facility.                                       Clinical Impression:  Final diagnoses:  [O69.4XX1] Vasa previa, fetus 1 of multiple gestation  [O69.4XX0] Vasa previa, single or unspecified fetus  [O47.9] Uterine contractions (Primary)  [Z3A.34] 34 weeks gestation of pregnancy          ED Disposition Condition    Send to L&D Stable        Massimo Harris MD PGY-3  Obstetrics and Gynecology          Massimo Harris MD  Resident  12/23/24 4127       Monet Dobbs MD  12/23/24 1218

## 2024-12-23 NOTE — LACTATION NOTE
LC visit to room to review pumping for an NICU baby. Gave mother NICU Mother's Breastfeeding Guide. Showed mother how to work pump, how to keep track of pumpings, how to label nicu breastmilk, how to clean pump parts and bring milk to NICU even if it is only a drop of milk. NICU uses mother's milk for mouth care so even small amounts are ok to bring to NICU. Mother aware to pump 8 or more times a day. Showed mother how to use Symphony pump on initiate setting. Call RN with any further questions.She has a Spectra personal pump.     iMICROQ Symphony pump, tubing, collections containers and labels brought to bedside.  Discussed proper pump setting of initiation phase.  Instructed on proper usage of pump and to adjust suction according to maximum comfort level.  Verified appropriate flange fit.  Educated on the frequency and duration of pumping in order to promote and maintain a full milk supply.  Hands on pumping technique reviewed.  Encouraged hand expression after pumping.  Instructed on cleaning of breast pump parts.  Written instructions also given.  Pt verbalized understanding and appropriate recall for proper milk handling, collection, labeling, storage and transportation.       12/23/24 1430   Maternal Assessment   Breast Shape Bilateral:;round;wide   Breast Density Bilateral:;soft   Areola Bilateral:;elastic   Nipples Bilateral:;bulbous;everted   Maternal Infant Feeding   Maternal Emotional State assist needed   Equipment Type   Breast Pump Type double electric, hospital grade   Breast Pump Flange Type hard   Breast Pump Flange Size 24 mm   Breast Pumping   Breast Pumping Interventions frequent pumping encouraged   Breast Pumping hand expression utilized;double electric breast pump utilized

## 2024-12-23 NOTE — ANESTHESIA PROCEDURE NOTES
Epidural    Patient location during procedure: OB   Reason for block: primary anesthetic   Reason for block: labor analgesia requested by patient and obstetrician  Diagnosis: IUP   Start time: 12/23/2024 9:00 AM  Timeout: 12/23/2024 8:59 AM  End time: 12/23/2024 9:05 AM  Surgery related to: Vaginal Delivery    Staffing  Performing Provider: Sanjuanita Montesinos MD  Authorizing Provider: Sanjuanita Montesinos MD    Staffing  Performed by: Sanjuanita Montesinos MD  Authorized by: Sanjuanita Montesinos MD        Preanesthetic Checklist  Completed: patient identified, IV checked, site marked, risks and benefits discussed, surgical consent, monitors and equipment checked, pre-op evaluation, timeout performed, anesthesia consent given, hand hygiene performed and patient being monitored  Preparation  Patient position: sitting  Prep: ChloraPrep  Patient monitoring: Pulse Ox  Reason for block: primary anesthetic   Epidural  Skin Anesthetic: lidocaine 1%  Skin Wheal: 3 mL  Administration type: continuous  Approach: midline  Interspace: L3-4    Injection technique: ARTURO saline  Needle and Epidural Catheter  Needle type: Tuohy   Needle gauge: 17  Needle length: 3.5 inches  Needle insertion depth: 6 cm  Catheter type: LiveIntent  Catheter size: 19 G  Catheter at skin depth: 10 cm  Insertion Attempts: 1  Test dose: 3 mL of lidocaine 1.5% with Epi 1-to-200,000  Additional Documentation: incremental injection, negative aspiration for heme and CSF, no paresthesia on injection, no signs/symptoms of IV or SA injection, no significant pain on injection and no significant complaints from patient  Needle localization: anatomical landmarks  Medications:  Volume per aspiration: 5 mL  Time between aspirations: 5 minutes   Assessment  Ease of block: easy  Patient's tolerance of the procedure: comfortable throughout block and no complaints No inadvertent dural puncture with Tuohy.  Dural puncture performed with spinal needle.

## 2024-12-23 NOTE — H&P
HISTORY AND PHYSICAL                                                OBSTETRICS          Subjective:       Annemarie Gupta is a 29 y.o.  female with IUP at 34w6d weeks gestation with a vasa previa who presents with possible ROM. The patient reports that she had increased discharge yesterday and then more this morning when she woke up. In the SAMUEL, she was noted to be closed and intact however, tiara every 2-3 mins. The decision was made to move forward with delivery via  Section.    Patient denies feeling contractions, denies vaginal bleeding, reports LOF.   Fetal Movement: normal.    This IUP is complicated by gHTN, vasa previa, anemia, and mood disorder.    Review of Systems   Constitutional:  Negative for fatigue and fever.   Respiratory:  Negative for shortness of breath.    Cardiovascular:  Negative for chest pain.   Gastrointestinal:  Negative for abdominal pain, nausea and vomiting.   Endocrine: Negative for hot flashes.   Genitourinary:  Positive for vaginal discharge. Negative for menstrual problem, pelvic pain, vaginal bleeding, vaginal pain and vaginal odor.   Musculoskeletal:  Negative for back pain.   Integumentary:  Negative for breast mass, nipple discharge and breast skin changes.   Neurological:  Negative for headaches.   Hematological:  Does not bruise/bleed easily.   Psychiatric/Behavioral:  Negative for depression.    Breast: Negative for mass, mastodynia, nipple discharge and skin changes      PMHx:   Past Medical History:   Diagnosis Date    Strep throat        PSHx:   Past Surgical History:   Procedure Laterality Date    INGUINAL HERNIA REPAIR         All: Review of patient's allergies indicates:  No Known Allergies    Meds: (Not in a hospital admission)      SH:   Social History     Socioeconomic History    Marital status:    Tobacco Use    Smoking status: Never    Smokeless tobacco: Never   Substance and Sexual Activity    Alcohol use: No    Drug use: Never     Sexual activity: Yes     Partners: Male     Birth control/protection: None   Social History Narrative    ** Merged History Encounter **          Social Drivers of Health     Financial Resource Strain: Low Risk  (2024)    Received from [facility]    Overall Financial Resource Strain (CARDIA)     Difficulty of Paying Living Expenses: Not hard at all   Food Insecurity: No Food Insecurity (2024)    Received from [facility]    Hunger Vital Sign     Worried About Running Out of Food in the Last Year: Never true     Ran Out of Food in the Last Year: Never true   Transportation Needs: No Transportation Needs (2024)    Received from [facility]    PRAPARE - Transportation     Lack of Transportation (Medical): No     Lack of Transportation (Non-Medical): No   Physical Activity: Insufficiently Active (2024)    Received from [facility]    Exercise Vital Sign     Days of Exercise per Week: 2 days     Minutes of Exercise per Session: 40 min   Stress: No Stress Concern Present (2024)    Received from [facility]    [employer] [place] [employer] - Occupational Stress Questionnaire     Feeling of Stress : Only a little       FH:   Family History   Problem Relation Name Age of Onset    Skin cancer Mother         OBHx:   OB History    Para Term  AB Living   1 0 0 0 0 0   SAB IAB Ectopic Multiple Live Births   0 0 0 0 0      # Outcome Date GA Lbr Reilly/2nd Weight Sex Type Anes PTL Lv   1 Current                Objective:       BP (!) 144/87   Pulse 109   Temp 97.9 °F (36.6 °C) (Oral)   Resp 18   LMP 04/15/2024 (Approximate)   SpO2 98%     Vitals:    24 0814 24 0818 24 0819 24 0822   BP:       Pulse: 110 100 109 109   Resp:       Temp:       TempSrc:       SpO2: 98%  98%        General:   alert, appears stated age and cooperative, no apparent distress   HENT:  normocephalic, atraumatic   Eyes:  extraocular movements and conjunctivae normal   Neck:  supple,  range of motion normal, no thyromegaly   Lungs:   no respiratory distress   Heart:   regular rate   Abdomen:  soft, non-tender, non-distended but gravid, no rebound or guarding    Extremities negative edema, negative erythema   FHT: 150, moderate BTBV, +accels, -decels;  Cat 1 (reassuring)                 TOCO: Q 2-3 minutes   Presentations: cephalic by ultrasound   Cervix: Closed visually   Sterile Speculum Exam: negative for ROM, negative ferning, nitrazine, normal MVP at 6    Lab Review  Blood Type pending  GBBS: unknown  Rubella: Immune  RPR: pending  HIV: pending  HepB: pending       Assessment:       34w6d weeks gestation with vasa previa for pLTCS    Plan:   Primary  Section   Risks, benefits, alternatives and possible complications have been discussed in detail with the patient.   - Consents signed and to chart  - Admit to Labor and Delivery unit  - Epidural per Anesthesia  - Draw CBC, T&S  - Notify Staff  - To the OR for pLTCS for vasa previa    Post-Partum Hemorrhage risk - high    gHTN  - BP as above  - asymptomatic  - preE labs as above  - Mag: not indicated  - Hypertensive agent: none    Anemia  - H/HL 10/32  - asymptomatic  - will need iron/colace pp    Mood disorder  - mood stable  - no home medication  - will need 2 week mood check pp    Massimo Harris MD PGY-3  Obstetrics and Gynecology

## 2024-12-23 NOTE — PLAN OF CARE
Problem:  Fall Injury Risk  Goal: Absence of Fall, Infant Drop and Related Injury  Outcome: Progressing     Problem: Adult Inpatient Plan of Care  Goal: Plan of Care Review  Outcome: Progressing  Goal: Patient-Specific Goal (Individualized)  Outcome: Progressing  Goal: Absence of Hospital-Acquired Illness or Injury  Outcome: Progressing  Goal: Optimal Comfort and Wellbeing  Outcome: Progressing  Goal: Readiness for Transition of Care  Outcome: Progressing     Problem:  Delivery  Goal: Bleeding is Controlled  Outcome: Progressing  Goal: Stable Fetal Wellbeing  Outcome: Progressing  Goal: Absence of Infection Signs and Symptoms  Outcome: Progressing  Goal: Effective Oxygenation and Ventilation  Outcome: Progressing     Problem: Breastfeeding  Goal: Effective Breastfeeding  Outcome: Progressing     Problem: Wound  Goal: Optimal Coping  Outcome: Progressing  Goal: Optimal Functional Ability  Outcome: Progressing  Goal: Absence of Infection Signs and Symptoms  Outcome: Progressing  Goal: Improved Oral Intake  Outcome: Progressing  Goal: Optimal Pain Control and Function  Outcome: Progressing  Goal: Skin Health and Integrity  Outcome: Progressing  Goal: Optimal Wound Healing  Outcome: Progressing     Problem: Postpartum ( Delivery)  Goal: Successful Parent Role Transition  Outcome: Progressing  Goal: Hemostasis  Outcome: Progressing  Goal: Effective Bowel Elimination  Outcome: Progressing  Goal: Fluid and Electrolyte Balance  Outcome: Progressing  Goal: Absence of Infection Signs and Symptoms  Outcome: Progressing  Goal: Anesthesia/Sedation Recovery  Outcome: Progressing  Goal: Optimal Pain Control and Function  Outcome: Progressing  Goal: Nausea and Vomiting Relief  Outcome: Progressing  Goal: Effective Urinary Elimination  Outcome: Progressing  Goal: Effective Oxygenation and Ventilation  Outcome: Progressing

## 2024-12-23 NOTE — TELEPHONE ENCOUNTER
"Pt states 35 weeks pregnant, scheduled for NST this AM. Pt c/o leaking fluid and possible mucus plug. Pt denies abdominal pain or bleeding. Per protocol, go to LD now. Pt verbalizes understanding.   Reason for Disposition   Leakage of fluid from vagina  (Exception: Patient is uncertain, but thinks it might be urine incontinence.)    Additional Information   Negative: [1] SEVERE abdominal pain (e.g., excruciating) AND [2] constant   Negative: SEVERE bleeding (e.g., continuous red blood from vagina, or large blood clots)   Negative: Umbilical cord hanging out of the vagina (shiny, white, curled appearance, "like telephone cord")   Negative: Uncontrollable urge to push (i.e., feels like baby is coming out now)   Negative: Can see baby   Negative: Sounds like a life-threatening emergency to the triager   Negative: < 20 weeks pregnant   Negative: Vaginal bleeding    Protocols used: Pregnancy - Rupture of Membranes [id number]    "

## 2024-12-23 NOTE — BRIEF OP NOTE
[facility]  Brief Op Note  Admit Date: 2024    Attending Physician: No att. providers found     Surgery Date: 2024     Surgeons and Role:     * Sherly Luis MD - Primary     * Sterling Wheatley MD - Resident - Assisting     * Dario Armendariz MD      Pre-op Diagnosis:  Vasa previa, fetus 1 of multiple gestation [O69.4XX1]     Post-op Diagnosis:  s/p mid transverse caesarian section.    Procedure(s) (LRB):   SECTION (N/A)    Anesthesia: Spinal/Epidural    US performed prior to surgery to identify the anatomy if the placenta and vasa previa.     Findings/Key Components: Female infant delivered via mid transverse caesarian section. Weight 3130g, APGARS 9/9. Low anterior placenta, normal appearance. Uterus, tubes and ovaries were normal. PPH from placental location, bleeding rapidly controlled after delivery.     Estimated Blood Loss: 1925 cc         Specimens:   Placenta was sent to pathology for complexity        Disposition: Patient to recovery in stable condition    Sterling Brewer MD  Obstetrics and Gynecology- PGY2

## 2024-12-24 PROBLEM — D64.9 ANEMIA: Status: ACTIVE | Noted: 2024-12-24

## 2024-12-24 LAB
BASOPHILS # BLD AUTO: 0.05 K/UL (ref 0–0.2)
BASOPHILS NFR BLD: 0.3 % (ref 0–1.9)
DIFFERENTIAL METHOD BLD: ABNORMAL
EOSINOPHIL # BLD AUTO: 0 K/UL (ref 0–0.5)
EOSINOPHIL NFR BLD: 0.1 % (ref 0–8)
ERYTHROCYTE [DISTWIDTH] IN BLOOD BY AUTOMATED COUNT: 13.7 % (ref 11.5–14.5)
HCT VFR BLD AUTO: 28.6 % (ref 37–48.5)
HGB BLD-MCNC: 9.6 G/DL (ref 12–16)
IMM GRANULOCYTES # BLD AUTO: 0.28 K/UL (ref 0–0.04)
IMM GRANULOCYTES NFR BLD AUTO: 1.8 % (ref 0–0.5)
LYMPHOCYTES # BLD AUTO: 2.5 K/UL (ref 1–4.8)
LYMPHOCYTES NFR BLD: 16.5 % (ref 18–48)
MCH RBC QN AUTO: 26.8 PG (ref 27–31)
MCHC RBC AUTO-ENTMCNC: 33.6 G/DL (ref 32–36)
MCV RBC AUTO: 80 FL (ref 82–98)
MONOCYTES # BLD AUTO: 1 K/UL (ref 0.3–1)
MONOCYTES NFR BLD: 6.7 % (ref 4–15)
NEUTROPHILS # BLD AUTO: 11.5 K/UL (ref 1.8–7.7)
NEUTROPHILS NFR BLD: 74.6 % (ref 38–73)
NRBC BLD-RTO: 0 /100 WBC
PLATELET # BLD AUTO: 179 K/UL (ref 150–450)
PMV BLD AUTO: 9.8 FL (ref 9.2–12.9)
RBC # BLD AUTO: 3.58 M/UL (ref 4–5.4)
RUBV IGG SER-ACNC: 25 IU/ML
RUBV IGG SER-IMP: REACTIVE
WBC # BLD AUTO: 15.4 K/UL (ref 3.9–12.7)

## 2024-12-24 PROCEDURE — 99024 POSTOP FOLLOW-UP VISIT: CPT | Mod: ,,, | Performed by: OBSTETRICS & GYNECOLOGY

## 2024-12-24 PROCEDURE — 36415 COLL VENOUS BLD VENIPUNCTURE: CPT

## 2024-12-24 PROCEDURE — 25000003 PHARM REV CODE 250

## 2024-12-24 PROCEDURE — 11000001 HC ACUTE MED/SURG PRIVATE ROOM

## 2024-12-24 PROCEDURE — 85025 COMPLETE CBC W/AUTO DIFF WBC: CPT

## 2024-12-24 PROCEDURE — 25000003 PHARM REV CODE 250: Performed by: OBSTETRICS & GYNECOLOGY

## 2024-12-24 PROCEDURE — 63600175 PHARM REV CODE 636 W HCPCS

## 2024-12-24 RX ORDER — LANOLIN ALCOHOL/MO/W.PET/CERES
1 CREAM (GRAM) TOPICAL DAILY
Status: DISCONTINUED | OUTPATIENT
Start: 2024-12-24 | End: 2024-12-26 | Stop reason: HOSPADM

## 2024-12-24 RX ORDER — OXYCODONE HYDROCHLORIDE 5 MG/1
5 TABLET ORAL EVERY 4 HOURS PRN
Qty: 20 TABLET | Refills: 0 | Status: SHIPPED | OUTPATIENT
Start: 2024-12-24

## 2024-12-24 RX ORDER — DEXTROMETHORPHAN HYDROBROMIDE, GUAIFENESIN 5; 100 MG/5ML; MG/5ML
650 LIQUID ORAL EVERY 6 HOURS
Qty: 60 TABLET | Refills: 1 | Status: SHIPPED | OUTPATIENT
Start: 2024-12-24

## 2024-12-24 RX ORDER — IBUPROFEN 600 MG/1
600 TABLET ORAL EVERY 6 HOURS
Qty: 60 TABLET | Refills: 1 | Status: SHIPPED | OUTPATIENT
Start: 2024-12-24

## 2024-12-24 RX ORDER — IBUPROFEN 400 MG/1
400 TABLET ORAL EVERY 6 HOURS
Status: DISCONTINUED | OUTPATIENT
Start: 2024-12-24 | End: 2024-12-25

## 2024-12-24 RX ADMIN — SIMETHICONE 80 MG: 80 TABLET, CHEWABLE ORAL at 01:12

## 2024-12-24 RX ADMIN — OXYCODONE AND ACETAMINOPHEN 1 TABLET: 10; 325 TABLET ORAL at 08:12

## 2024-12-24 RX ADMIN — TRAZODONE HYDROCHLORIDE 50 MG: 50 TABLET ORAL at 01:12

## 2024-12-24 RX ADMIN — VENLAFAXINE HYDROCHLORIDE 37.5 MG: 37.5 CAPSULE, EXTENDED RELEASE ORAL at 08:12

## 2024-12-24 RX ADMIN — OXYCODONE AND ACETAMINOPHEN 1 TABLET: 10; 325 TABLET ORAL at 05:12

## 2024-12-24 RX ADMIN — IBUPROFEN 400 MG: 400 TABLET ORAL at 09:12

## 2024-12-24 RX ADMIN — KETOROLAC TROMETHAMINE 30 MG: 30 INJECTION, SOLUTION INTRAMUSCULAR; INTRAVENOUS at 03:12

## 2024-12-24 RX ADMIN — DOCUSATE SODIUM 200 MG: 100 CAPSULE, LIQUID FILLED ORAL at 09:12

## 2024-12-24 RX ADMIN — OXYCODONE AND ACETAMINOPHEN 1 TABLET: 10; 325 TABLET ORAL at 01:12

## 2024-12-24 RX ADMIN — KETOROLAC TROMETHAMINE 30 MG: 30 INJECTION, SOLUTION INTRAMUSCULAR; INTRAVENOUS at 08:12

## 2024-12-24 RX ADMIN — PRENATAL VIT W/ FE FUMARATE-FA TAB 27-0.8 MG 1 TABLET: 27-0.8 TAB at 08:12

## 2024-12-24 RX ADMIN — OXYCODONE AND ACETAMINOPHEN 1 TABLET: 10; 325 TABLET ORAL at 09:12

## 2024-12-24 RX ADMIN — FERROUS SULFATE TAB 325 MG (65 MG ELEMENTAL FE) 1 EACH: 325 (65 FE) TAB at 08:12

## 2024-12-24 RX ADMIN — TRAZODONE HYDROCHLORIDE 50 MG: 50 TABLET ORAL at 09:12

## 2024-12-24 RX ADMIN — DOCUSATE SODIUM 200 MG: 100 CAPSULE, LIQUID FILLED ORAL at 08:12

## 2024-12-24 RX ADMIN — OXYCODONE AND ACETAMINOPHEN 1 TABLET: 10; 325 TABLET ORAL at 12:12

## 2024-12-24 NOTE — ANESTHESIA POSTPROCEDURE EVALUATION
Anesthesia Post Evaluation    Patient: Annemarie Gupta    Procedure(s) Performed: Procedure(s) (LRB):   SECTION (N/A)    Final Anesthesia Type: CSE      Patient location during evaluation: labor & delivery  Patient participation: Yes- Able to Participate  Level of consciousness: awake and alert and oriented  Post-procedure vital signs: reviewed and stable  Pain management: adequate  Airway patency: patent  MARTA mitigation strategies: Use of major conduction anesthesia (spinal/epidural) or peripheral nerve block and Multimodal analgesia  PONV status at discharge: No PONV  Anesthetic complications: no      Cardiovascular status: hemodynamically stable  Respiratory status: unassisted, spontaneous ventilation and room air  Hydration status: euvolemic  Follow-up not needed.  Comments: Doing well, ambulating, voiding, and tolerating regular diet. No numbness or paresthesias BLE. No HA.              Vitals Value Taken Time   /73 24 0336   Temp 36.7 °C (98 °F) 24 0336   Pulse 78 24 0336   Resp 18 24 0837   SpO2 96 % 24 033         Event Time   Out of Recovery 13:24:00         Pain/Gopi Score: Pain Rating Prior to Med Admin: 9 (2024  8:37 AM)  Pain Rating Post Med Admin: 5 (2024  2:03 AM)

## 2024-12-24 NOTE — PLAN OF CARE
Copied from baby's chart    SOCIAL WORK DISCHARGE PLANNING ASSESSMENT     SW completed discharge planning assessment with pt's parents at pt's bedside.  Pt's parents were easily engaged. Education on the role of  was provided. Emotional support provided throughout assessment.        Legal Name: Milly Gupta         :  2026  Address: [address] [address]  Parent's Phone Numbers: [phone]   [phone]     Pediatrician: AVA Dent    Education: Information given on NICU Education Classes; Physician/NNP daily rounds; and Postpartum Depression signs.   Potential Eligibility for SSI Benefits: No        Problem List       Patient Active Problem List   Diagnosis    Healthcare maintenance    Alteration in nutrition in infant      infant of 34 completed weeks of gestation    Sacral dimple in                Birth Hospital:[facility]           DONOVAN: 23     Birth Weight:   3.13 kg (6 lb 14.4 oz)              Birth Length:                       Gestational Age: 34w6d           Apgars    Living status: Living  Apgar Component Scores:  1 min.:  5 min.:  10 min.:  15 min.:  20 min.:    Skin color:  1  1          Heart rate:  2  2          Reflex irritability:  2  2          Muscle tone:  2  2          Respiratory effort:  2  2          Total:  9  9          Apgars assigned by: NICU           24 1047   NICU Assessment   Assessment Type Discharge Planning Assessment   Source of Information family   Verified Demographic and Insurance Information Yes   Insurance Commercial   Spiritual Affiliation Non-Taoist   Lives With mother;father   Number people in home 3 including infant   Relationship Status of Parents    Other children (include names and ages) None   Father's Involvement Fully Involved   Is Father signing the birth certificate Yes   Father Name and  Azar Gupta-3/19/1991   Father's Address Same as mom    Other Contacts Names and Numbers [facility] [phone]   Infant Feeding Plan breastfeeding;formula feeding   Previous Breastfeeding Experience no   Does baby have crib or safe sleep space? Yes   Do you have a car seat? Yes   Resource/Environmental Concerns none   Environment Concerns none   Resources/Education Provided [facility] Financial Services;Healthy [place] Insurance plan;Preparing for Your Baby's Discharge Home;Support Resources for NICU Families;WIC;Post Partum Depression;My NICU Baby Tony;Parents as teachers   DME Needed Upon Discharge  none   DCFS No indications (Indicators for Report)   Discharge Plan A Home with family   Discharge Plan B Home   Do you have any problems affording any of your prescribed medications? No

## 2024-12-24 NOTE — PROGRESS NOTES
POSTPARTUM PROGRESS NOTE    Subjective:     PPD/POD#: 1   Procedure: Primary mid transverse CS   EGA: 34w6d   N/V: No   F/C: No   Abd Pain: Mild, well-controlled with oral pain medication   Lochia: Mild   Voiding: Yes   Ambulating: Yes   Bowel fnc: Yes   Contraception: Undecided   Patient denies headache, dizziness, visual changes, chest pain, shortness of breath, nausea or vomiting and right upper quadrant pain     Objective:      Temp:  [97.9 °F (36.6 °C)-98.2 °F (36.8 °C)] 98 °F (36.7 °C)  Pulse:  [] 78  Resp:  [16-20] 16  SpO2:  [96 %-100 %] 96 %  BP: (114-150)/(68-99) 118/73    Abdomen: Soft, appropriately tender   Uterus: Firm, no fundal tenderness   Incision: Bandage in place without shadowing, pressure dressing to be removed today.      Lab Review    Recent Labs   Lab 24  1630 24  0801    137   K 3.4* 3.8    107   CO2 18* 18*   BUN 5* 5*   CREATININE 0.6 0.5   * 82   PROT 5.9* 5.9*   BILITOT 0.1 0.2   ALKPHOS 114 109   ALT 13 13   AST 11 15       Recent Labs   Lab 24  1630 24  0801 24  0415   WBC 13.89* 13.28* 15.40*   HGB 10.7* 11.3* 9.6*   HCT 32.6* 35.1* 28.6*   MCV 81* 80* 80*    202 179     Protein Creatinine Ratios:    Creatinine, Urine   Date Value Ref Range Status   2024 38.7 15.0 - 325.0 mg/dL Final   2024 40.0 15.0 - 325.0 mg/dL Final   2024 74.3 15.0 - 325.0 mg/dL Final     Protein, Urine Random   Date Value Ref Range Status   2024 7 0 - 15 mg/dL Final   2024 <7 0 - 15 mg/dL Final   2024 10 0 - 15 mg/dL Final     Prot/Creat Ratio, Urine   Date Value Ref Range Status   2024 0.18 0.00 - 0.20 Final   2024 Unable to calculate 0.00 - 0.20 Final   2024 0.13 0.00 - 0.20 Final         I/O    Intake/Output Summary (Last 24 hours) at 2024 0822  Last data filed at 2024 0345  Gross per 24 hour   Intake 900 ml   Output 5075 ml   Net -4175 ml        Assessment and Plan:   30 yo   POD#1 s/p PLTCS 2/2 vasa previa c/b PPH with ABLA, gHTN, mood disorder, GERD    Postpartum care:  - Patient doing well and meeting appropriate milestones  - Ambulating, voiding, tolerating PO  - Baby at NICU  - Continue routine management and advances.    2.   Anemia / PPH  - H/H as above  - QBL: 1900 cc  - asymptomatic  - iron/colace     3.   gHTN  - BP as above  - asymptomatic  - preE labs as above  - Labs Cr 0.5 Plts 179 ALT/AST 13/15 PCR 0.18  - UOP:100+ unmeasured voids X 2 overnight  - Mag: not indicated  - Hypertensive agent none     4.   Mood Disorder/anxiety  - Mood stable  - Medications: Trazodone, Effexor  - Will need 2 week postpartum mood check     5.   GERD  - home pantoprazole    Massimo Harris MD PGY-3  Obstetrics and Gynecology    Attending Attestation  I agree with the above edited resident note. Pt seen and examined, chart and labs reviewed.    Briefly, 30 yo  POD#1 s/p PLTCS 2/2 vasa previa at 34wga. Pt doing well and meeting appropriate day 1 milestones. Ambulating, voiding, tolerating PO. PPH with ABLA, pt asymptomatic and not meeting criteria for transfusion. Mood stable. Asymptomatic for S/S of PreE, labs as above. Baby in NICU, doing well.     All questions answered. Cont routine postop care as above.     Olimpia Rea MD  OB Hospitalist  2024

## 2024-12-24 NOTE — PLAN OF CARE
Patient safety maintained, side rails up, bed low and locked position. Pt ambulating and voiding independently.  Pain well controlled with PRN pain medication. Fundus midline, firm, with moderate  lochia. Infant in the NICU. Incision clean, dry, and intact.  Significant other at bedside and assisting in patient's care. Will continue to monitor.

## 2024-12-25 PROBLEM — D62 ACUTE BLOOD LOSS ANEMIA (ABLA): Status: ACTIVE | Noted: 2024-12-25

## 2024-12-25 PROCEDURE — 25000003 PHARM REV CODE 250

## 2024-12-25 PROCEDURE — 25000003 PHARM REV CODE 250: Performed by: OBSTETRICS & GYNECOLOGY

## 2024-12-25 PROCEDURE — 11000001 HC ACUTE MED/SURG PRIVATE ROOM

## 2024-12-25 RX ORDER — OXYCODONE HYDROCHLORIDE 10 MG/1
10 TABLET ORAL EVERY 4 HOURS PRN
Status: DISCONTINUED | OUTPATIENT
Start: 2024-12-25 | End: 2024-12-26 | Stop reason: HOSPADM

## 2024-12-25 RX ORDER — ACETAMINOPHEN 325 MG/1
650 TABLET ORAL EVERY 6 HOURS
Status: DISCONTINUED | OUTPATIENT
Start: 2024-12-25 | End: 2024-12-25

## 2024-12-25 RX ORDER — IBUPROFEN 600 MG/1
600 TABLET ORAL EVERY 6 HOURS
Status: DISCONTINUED | OUTPATIENT
Start: 2024-12-25 | End: 2024-12-26 | Stop reason: HOSPADM

## 2024-12-25 RX ORDER — OXYCODONE HYDROCHLORIDE 5 MG/1
5 TABLET ORAL EVERY 4 HOURS PRN
Status: DISCONTINUED | OUTPATIENT
Start: 2024-12-25 | End: 2024-12-26 | Stop reason: HOSPADM

## 2024-12-25 RX ORDER — NIFEDIPINE 30 MG/1
30 TABLET, EXTENDED RELEASE ORAL DAILY
Status: DISCONTINUED | OUTPATIENT
Start: 2024-12-25 | End: 2024-12-26 | Stop reason: HOSPADM

## 2024-12-25 RX ORDER — ACETAMINOPHEN 325 MG/1
650 TABLET ORAL EVERY 6 HOURS
Status: DISCONTINUED | OUTPATIENT
Start: 2024-12-25 | End: 2024-12-26 | Stop reason: HOSPADM

## 2024-12-25 RX ADMIN — FERROUS SULFATE TAB 325 MG (65 MG ELEMENTAL FE) 1 EACH: 325 (65 FE) TAB at 10:12

## 2024-12-25 RX ADMIN — ACETAMINOPHEN 650 MG: 325 TABLET, FILM COATED ORAL at 10:12

## 2024-12-25 RX ADMIN — OXYCODONE HYDROCHLORIDE 10 MG: 10 TABLET ORAL at 04:12

## 2024-12-25 RX ADMIN — OXYCODONE HYDROCHLORIDE 10 MG: 10 TABLET ORAL at 10:12

## 2024-12-25 RX ADMIN — IBUPROFEN 400 MG: 400 TABLET ORAL at 04:12

## 2024-12-25 RX ADMIN — OXYCODONE AND ACETAMINOPHEN 1 TABLET: 10; 325 TABLET ORAL at 01:12

## 2024-12-25 RX ADMIN — IBUPROFEN 600 MG: 600 TABLET, FILM COATED ORAL at 10:12

## 2024-12-25 RX ADMIN — VENLAFAXINE HYDROCHLORIDE 37.5 MG: 37.5 CAPSULE, EXTENDED RELEASE ORAL at 08:12

## 2024-12-25 RX ADMIN — OXYCODONE AND ACETAMINOPHEN 1 TABLET: 10; 325 TABLET ORAL at 05:12

## 2024-12-25 RX ADMIN — DOCUSATE SODIUM 200 MG: 100 CAPSULE, LIQUID FILLED ORAL at 10:12

## 2024-12-25 RX ADMIN — SENNOSIDES AND DOCUSATE SODIUM 1 TABLET: 8.6; 5 TABLET ORAL at 04:12

## 2024-12-25 RX ADMIN — ACETAMINOPHEN 650 MG: 325 TABLET, FILM COATED ORAL at 04:12

## 2024-12-25 RX ADMIN — PRENATAL VIT W/ FE FUMARATE-FA TAB 27-0.8 MG 1 TABLET: 27-0.8 TAB at 10:12

## 2024-12-25 RX ADMIN — IBUPROFEN 600 MG: 600 TABLET, FILM COATED ORAL at 04:12

## 2024-12-25 RX ADMIN — TRAZODONE HYDROCHLORIDE 50 MG: 50 TABLET ORAL at 10:12

## 2024-12-25 RX ADMIN — SIMETHICONE 80 MG: 80 TABLET, CHEWABLE ORAL at 04:12

## 2024-12-25 RX ADMIN — NIFEDIPINE 30 MG: 30 TABLET, FILM COATED, EXTENDED RELEASE ORAL at 08:12

## 2024-12-25 NOTE — LACTATION NOTE
Lactation Round: Pt not in room. FOB available. LC provided education to FOB and shared that LC will round once Pt returns to the floor.

## 2024-12-25 NOTE — PROGRESS NOTES
POSTPARTUM PROGRESS NOTE    Subjective:     PPD/POD#: 2   Procedure: Primary mid transverse CS (Vasa Previa)   EGA: 34w6d   N/V: No   F/C: No   Abd Pain: Mild, well-controlled with oral pain medication   Lochia: Mild   Voiding: Yes   Ambulating: Yes   Bowel fnc: Yes   Contraception: Per primary OB     Patient denies HA, vision changes, CP, SOB, & RUQ pain.  Patient denies dizziness, lightheadedness, weakness, and palpitations.      Objective:      Temp:  [97.5 °F (36.4 °C)-98.3 °F (36.8 °C)] 98.3 °F (36.8 °C)  Pulse:  [] 97  Resp:  [16-18] 16  SpO2:  [96 %-99 %] 99 %  BP: (125-151)/(72-89) 151/89    Abdomen: Soft, appropriately tender   Uterus: Firm, no fundal tenderness   Incision: Bandage in place with minimal shadowing     Lab Review    Recent Labs   Lab 12/20/24  1630 12/23/24  0801    137   K 3.4* 3.8    107   CO2 18* 18*   BUN 5* 5*   CREATININE 0.6 0.5   * 82   PROT 5.9* 5.9*   BILITOT 0.1 0.2   ALKPHOS 114 109   ALT 13 13   AST 11 15       Recent Labs   Lab 12/20/24  1630 12/23/24  0801 12/24/24  0415   WBC 13.89* 13.28* 15.40*   HGB 10.7* 11.3* 9.6*   HCT 32.6* 35.1* 28.6*   MCV 81* 80* 80*    202 179     Protein Creatinine Ratios:    Creatinine, Urine   Date Value Ref Range Status   12/23/2024 38.7 15.0 - 325.0 mg/dL Final   12/20/2024 40.0 15.0 - 325.0 mg/dL Final   11/29/2024 74.3 15.0 - 325.0 mg/dL Final     Protein, Urine Random   Date Value Ref Range Status   12/23/2024 7 0 - 15 mg/dL Final   12/20/2024 <7 0 - 15 mg/dL Final   11/29/2024 10 0 - 15 mg/dL Final     Prot/Creat Ratio, Urine   Date Value Ref Range Status   12/23/2024 0.18 0.00 - 0.20 Final   12/20/2024 Unable to calculate 0.00 - 0.20 Final   11/29/2024 0.13 0.00 - 0.20 Final         I/O  No intake or output data in the 24 hours ending 12/25/24 6049       Assessment and Plan:     Postpartum care:  - Patient doing well and meeting appropriate milestones  - Ambulating, voiding, tolerating PO  - Baby at NICU  -  Continue routine management and advances.    2.   Anemia / PPH  - H/H as above  - QBL: 1900 cc  - asymptomatic  - iron/colace     3.   gHTN  - BP as above  - asymptomatic  - preE labs as above  - Labs Cr 0.5 Plts 179 ALT/AST 13/15 PCR 0.18  - Mag: not indicated  - Hypertensive agent: procardia 30     4.   Mood Disorder/anxiety  - Mood stable  - Medications: Trazodone, Effexor  - Will need 2 week postpartum mood check     5.   GERD  - home pantoprazole    Arti Arizmendi MD  OB/GYN PGY-2

## 2024-12-25 NOTE — LACTATION NOTE
Lactation Round: LC introduced self. Pt expressed concern related to amount of colostrum pumping is yielding. LC reassured Pt and reviewed volume expectation. LC reinforced education on use and maintenance of Medela Symphony breast pump. Pt shared that baby has been latching. LC encouraged Pt to pump afterwards.  All questions answered.

## 2024-12-25 NOTE — PLAN OF CARE
Mother is pumping 8 or more times a day. Expressed 5 mls this morning. Cleaned all pump parts with each use. Sterilized parts once a day. Labeled milk with date, time and meds. Brought milk straight to NICU or give to nurse to store in [facility] fridge. Call LC with questions.

## 2024-12-26 ENCOUNTER — LACTATION ENCOUNTER (OUTPATIENT)
Dept: INTENSIVE CARE | Facility: OTHER | Age: 29
End: 2024-12-26

## 2024-12-26 VITALS
OXYGEN SATURATION: 98 % | RESPIRATION RATE: 18 BRPM | TEMPERATURE: 98 F | BODY MASS INDEX: 32.27 KG/M2 | HEART RATE: 96 BPM | SYSTOLIC BLOOD PRESSURE: 157 MMHG | HEIGHT: 68 IN | DIASTOLIC BLOOD PRESSURE: 95 MMHG | WEIGHT: 212.94 LBS

## 2024-12-26 LAB
BLD PROD TYP BPU: NORMAL
BLD PROD TYP BPU: NORMAL
BLOOD UNIT EXPIRATION DATE: NORMAL
BLOOD UNIT EXPIRATION DATE: NORMAL
BLOOD UNIT TYPE CODE: 6200
BLOOD UNIT TYPE CODE: 6200
BLOOD UNIT TYPE: NORMAL
BLOOD UNIT TYPE: NORMAL
CODING SYSTEM: NORMAL
CODING SYSTEM: NORMAL
CROSSMATCH INTERPRETATION: NORMAL
CROSSMATCH INTERPRETATION: NORMAL
DISPENSE STATUS: NORMAL
DISPENSE STATUS: NORMAL
NUM UNITS TRANS PACKED RBC: NORMAL
NUM UNITS TRANS PACKED RBC: NORMAL

## 2024-12-26 PROCEDURE — 25000003 PHARM REV CODE 250

## 2024-12-26 PROCEDURE — 25000003 PHARM REV CODE 250: Performed by: OBSTETRICS & GYNECOLOGY

## 2024-12-26 PROCEDURE — 99239 HOSP IP/OBS DSCHRG MGMT >30: CPT | Mod: ,,, | Performed by: OBSTETRICS & GYNECOLOGY

## 2024-12-26 RX ORDER — NIFEDIPINE 30 MG/1
30 TABLET, EXTENDED RELEASE ORAL DAILY
Qty: 30 TABLET | Refills: 11 | Status: SHIPPED | OUTPATIENT
Start: 2024-12-26 | End: 2025-12-26

## 2024-12-26 RX ADMIN — DOCUSATE SODIUM 200 MG: 100 CAPSULE, LIQUID FILLED ORAL at 07:12

## 2024-12-26 RX ADMIN — ACETAMINOPHEN 650 MG: 325 TABLET, FILM COATED ORAL at 04:12

## 2024-12-26 RX ADMIN — IBUPROFEN 600 MG: 600 TABLET, FILM COATED ORAL at 04:12

## 2024-12-26 RX ADMIN — PRENATAL VIT W/ FE FUMARATE-FA TAB 27-0.8 MG 1 TABLET: 27-0.8 TAB at 07:12

## 2024-12-26 RX ADMIN — IBUPROFEN 600 MG: 600 TABLET, FILM COATED ORAL at 10:12

## 2024-12-26 RX ADMIN — ACETAMINOPHEN 650 MG: 325 TABLET, FILM COATED ORAL at 10:12

## 2024-12-26 RX ADMIN — NIFEDIPINE 30 MG: 30 TABLET, FILM COATED, EXTENDED RELEASE ORAL at 07:12

## 2024-12-26 RX ADMIN — FERROUS SULFATE TAB 325 MG (65 MG ELEMENTAL FE) 1 EACH: 325 (65 FE) TAB at 07:12

## 2024-12-26 RX ADMIN — VENLAFAXINE HYDROCHLORIDE 37.5 MG: 37.5 CAPSULE, EXTENDED RELEASE ORAL at 07:12

## 2024-12-26 RX ADMIN — OXYCODONE HYDROCHLORIDE 10 MG: 10 TABLET ORAL at 02:12

## 2024-12-26 NOTE — DISCHARGE SUMMARY
Delivery Discharge Summary  Obstetrics      Primary OB Clinician: No att. providers found      Admission date: 2024  Discharge date: 2024    Disposition: To home, self care    Discharge Diagnosis List:      Patient Active Problem List   Diagnosis    Vasa previa    Subchorionic hemorrhage of placenta in second trimester    Cystic fibrosis carrier    Anxiety during pregnancy    Gestational hypertension, third trimester    Postpartum hemorrhage    Status post primary MID transverse  section (vasa previa)    Anemia    Acute blood loss anemia (ABLA)       Procedure: , due to vasa previa    Hospital Course:  Annemarie Gupta is a 29 y.o. now , POD #3 primary mid transverse CS who was presented  on 2024 at 34w6d for rule rupture of membranes. In the SAMUEL, the patient was noted to be intact but tiara regularly. The decision was made to proceed with delivery via  due to pregnancy complicated by vasa previa which was performed without complications. Patient was subsequently admitted to labor and delivery unit with signed consents.     Please see delivery note for further details. Her postpartum course was complicated by gestational HTN requiring an antihypertensive. The patient was started on Procardia 30 XL. She was also started on iron supplements for acute blood loss anemia. On discharge day, patient's pain is controlled with oral pain medications. Pt is tolerating ambulation without SOB or CP, and regular diet without N/V. Reports lochia is mild. Denies any HA, vision changes, F/C, LE swelling. Denies any breast pain/soreness.    Pt in stable condition and ready for discharge. She has been instructed to start and/or continue medications and follow up with her obstetrics provider as listed below.    Pertinent studies:  CBC  Recent Labs   Lab 24  1630 24  0801 24  0415   WBC 13.89* 13.28* 15.40*   HGB 10.7* 11.3* 9.6*   HCT 32.6* 35.1* 28.6*   MCV  "81* 80* 80*    202 179        Immunization History   Administered Date(s) Administered    COVID-19, MRNA, LN-S, PF (Pfizer) (Purple Cap) 2020, 2021    DTaP 1993, 1993, 1994, 1996, 10/03/1997    HIB 1993, 1994, 1994, 1994    HPV Quadrivalent 2008, 2008, 2009    Hepatitis B, Pediatric/Adolescent 1993, 1993, 1994    IPV 1993, 1994, 1996, 10/03/1997    Influenza - Quadrivalent - PF *Preferred* (6 months and older) 12/15/2021    Influenza - Trivalent - Afluria, Fluzone MDV 2013    Influenza - Trivalent - Fluzone High Dose - PF (65 years and older) 10/15/2020, 10/15/2020    Influenza Split 2013    MMR 1994, 10/03/1997    Meningococcal Conjugate (MCV4P) 05/15/2007, 2011    PPD Test 2013, 2013, 10/27/2014, 10/27/2014    Td (Adult), Unspecified Formulation 2003, 2005    Td - Not Adsorbed (Adult) 2003, 2005    Tdap 2009    Varicella 2013, 2013        Delivery:    Episiotomy:     Lacerations:     Repair suture:     Repair # of packets:     Blood loss (ml):       Birth information:  YOB: 2026   Time of birth: 9:28 AM   Sex: female   Delivery type: , Low Transverse   Gestational Age: 34w6d     Measurements    Weight: 3130 g  Weight (lbs): 6 lb 14.4 oz  Length: 46.2 cm  Length (in): 18.19"  Head circumference: 33.7 cm         Delivery Clinician: Delivery Providers    Delivering clinician: Sherly Luis MD   Provider Role    Dario Armendariz MD Neira Gesteira, Andrea, MD [place], Madeleine, RN     [place], [place], ST              Additional  information:  Forceps:    Vacuum:    Breech:    Observed anomalies      Living?:     Apgars    Living status: Living  Apgar Component Scores:  1 min.:  5 min.:  10 min.:  15 min.:  20 min.:    Skin color:  1  1       Heart rate:  2  2       Reflex irritability:  2  " 2       Muscle tone:  2  2       Respiratory effort:  2  2       Total:  9  9       Apgars assigned by: NICU         Placenta: Delivered:       appearance    Patient Instructions:   Discharge Medication List as of 12/26/2024 11:08 AM        START taking these medications    Details   acetaminophen (TYLENOL) 650 MG TbSR Take 1 tablet (650 mg total) by mouth every 6 (six) hours. Alternate between ibuprofen and tylenol every 3 hours. For example: @0800: ibuprofen 600mg @1100: tylenol 650mg @1400: ibuprofen 600mg @1700: tylenol 650 mg @2000: ibuprofen 600mg, Starting Tue 1 2/24/2024, Normal      ibuprofen (ADVIL,MOTRIN) 600 MG tablet Take 1 tablet (600 mg total) by mouth every 6 (six) hours. Alternate between ibuprofen and tylenol every 3 hours. For example: @0800: ibuprofen 600mg @1100: tylenol 650mg @1400: ibuprofen 600mg @1700: tylenol 650 mg @2000: ibuprofen 600mg, Starting Tue 1 2/24/2024, Normal      NIFEdipine (PROCARDIA-XL) 30 MG (OSM) 24 hr tablet Take 1 tablet (30 mg total) by mouth once daily., Starting Thu 12/26/2024, Until Fri 12/26/2025, Normal      oxyCODONE (ROXICODONE) 5 MG immediate release tablet Take 1 tablet (5 mg total) by mouth every 4 (four) hours as needed., Starting Tue 12/24/2024, Normal           CONTINUE these medications which have NOT CHANGED    Details   cetirizine (ZYRTEC) 10 mg Cap Zyrtec, Historical Med      iron,carb/vit C/vit B12/folic (IRON 100 PLUS ORAL) Take by mouth., Historical Med      magnesium 200 mg Tab Take by mouth once., Historical Med      traZODone (DESYREL) 100 MG tablet Take 50 mg by mouth every evening., Starting Mon 1/11/2021, Historical Med      venlafaxine (EFFEXOR-XR) 37.5 MG 24 hr capsule Take 37.5 mg by mouth once daily., Starting Mon 9/30/2024, Historical Med           STOP taking these medications       cyclobenzaprine (FLEXERIL) 10 MG tablet Comments:   Reason for Stopping:         SUMAtriptan succinate (ZEMBRACE SYMTOUCH) 3 mg/0.5 mL PnIj Comments:   Reason  for Stopping:               Discharge Procedure Orders   Diet Adult Regular     Lifting restrictions   Order Comments: Please do not lift anything heavier than your baby for 6 weeks if you've had a      Pelvic Rest   Order Comments: Nothing in the vagina for 6 weeks until evaluation by your OBGYN at your 6 week postpartum visit     Notify your health care provider if you experience any of the following:  temperature >100.4     Notify your health care provider if you experience any of the following:  persistent nausea and vomiting or diarrhea     Notify your health care provider if you experience any of the following:  severe uncontrolled pain     Notify your health care provider if you experience any of the following:  redness, tenderness, or signs of infection (pain, swelling, redness, odor or green/yellow discharge around incision site)     Notify your health care provider if you experience any of the following:  difficulty breathing or increased cough     Notify your health care provider if you experience any of the following:  severe persistent headache     Notify your health care provider if you experience any of the following:  worsening rash     Notify your health care provider if you experience any of the following:  persistent dizziness, light-headedness, or visual disturbances     Notify your health care provider if you experience any of the following:  increased confusion or weakness     Notify your health care provider if you experience any of the following:   Order Comments: Vaginal bleeding that saturates >1 pad an hour for >1 hour     Lifting restrictions   Order Comments: Lifting no more than infant's weight for 6 weeks.     No driving until:   Order Comments: No driving until discontinued narcotics and no pain with stepping on brakes.     Pelvic Rest   Order Comments: Nothing in the vagina including intercourse for 6 weeks.     Notify your health care provider if you experience any of the  following:  temperature >100.4     Notify your health care provider if you experience any of the following:  persistent nausea and vomiting or diarrhea     Notify your health care provider if you experience any of the following:  severe uncontrolled pain     Notify your health care provider if you experience any of the following:  redness, tenderness, or signs of infection (pain, swelling, redness, odor or green/yellow discharge around incision site)     Notify your health care provider if you experience any of the following:  severe persistent headache     Notify your health care provider if you experience any of the following:  persistent dizziness, light-headedness, or visual disturbances     Notify your health care provider if you experience any of the following:  increased confusion or weakness     Notify your health care provider if you experience any of the following:   Order Comments: Heavy vaginal bleeding saturating more than 2 pads in 1 hour.     Activity as tolerated     Activity as tolerated         Massimo Harris MD PGY-3  Obstetrics and Gynecology

## 2024-12-26 NOTE — PLAN OF CARE
Discharge teaching provided, patient verbalized understanding. VSS. Patient ambulating and voiding independently and without difficulty. Fundus firm without massage, midline, with light rubra noted. Incision site intact with no signs or symptoms of infection. Pain controlled without PRN pain medications. Pumping every 2-3 hours. No further concerns at this time.

## 2024-12-26 NOTE — PROGRESS NOTES
POSTPARTUM PROGRESS NOTE    Subjective:     PPD/POD#: 3   Procedure: Primary mid transverse CS (Vasa Previa)   EGA: 34w6d   N/V: No   F/C: No   Abd Pain: Mild, well-controlled with oral pain medication   Lochia: Mild   Voiding: Yes   Ambulating: Yes   Bowel fnc: Yes   Contraception: Per primary OB     Patient denies HA, vision changes, CP, SOB, & RUQ pain.  Patient denies dizziness, lightheadedness, weakness, and palpitations.      She desires to go home tomorrow.     Objective:      Temp:  [97.6 °F (36.4 °C)-98.9 °F (37.2 °C)] 98 °F (36.7 °C)  Pulse:  [] 92  Resp:  [16-19] 18  SpO2:  [95 %-100 %] 99 %  BP: (124-145)/(73-95) 124/73    Abdomen: Soft, appropriately tender   Uterus: Firm, no fundal tenderness   Incision: Bandage in place with minimal shadowing     Lab Review    Recent Labs   Lab 12/20/24  1630 12/23/24  0801    137   K 3.4* 3.8    107   CO2 18* 18*   BUN 5* 5*   CREATININE 0.6 0.5   * 82   PROT 5.9* 5.9*   BILITOT 0.1 0.2   ALKPHOS 114 109   ALT 13 13   AST 11 15       Recent Labs   Lab 12/20/24  1630 12/23/24  0801 12/24/24  0415   WBC 13.89* 13.28* 15.40*   HGB 10.7* 11.3* 9.6*   HCT 32.6* 35.1* 28.6*   MCV 81* 80* 80*    202 179     Protein Creatinine Ratios:    Creatinine, Urine   Date Value Ref Range Status   12/23/2024 38.7 15.0 - 325.0 mg/dL Final   12/20/2024 40.0 15.0 - 325.0 mg/dL Final   11/29/2024 74.3 15.0 - 325.0 mg/dL Final     Protein, Urine Random   Date Value Ref Range Status   12/23/2024 7 0 - 15 mg/dL Final   12/20/2024 <7 0 - 15 mg/dL Final   11/29/2024 10 0 - 15 mg/dL Final     Prot/Creat Ratio, Urine   Date Value Ref Range Status   12/23/2024 0.18 0.00 - 0.20 Final   12/20/2024 Unable to calculate 0.00 - 0.20 Final   11/29/2024 0.13 0.00 - 0.20 Final         I/O  No intake or output data in the 24 hours ending 12/26/24 0830       Assessment and Plan:     Postpartum care:  - Patient doing well and meeting appropriate milestones  - Ambulating,  voiding, tolerating PO  - Baby at NICU  - Continue routine management and advances.    2.   Anemia / PPH  - H/H as above  - QBL: 1900 cc  - asymptomatic  - iron/colace     3.   gHTN  - BP as above  - asymptomatic  - preE labs as above  - Labs Cr 0.5 Plts 179 ALT/AST 13/15 PCR 0.18  - Mag: not indicated  - Hypertensive agent: procardia 30 (12/25)    4.   Mood Disorder/anxiety  - Mood stable  - Medications: Trazodone, Effexor  - Will need 2 week postpartum mood check     5.   GERD  - home pantoprazole  ____________________________________________________________  Fellow Attestation    Hospital Day: 4     I have reviewed and concur with the resident's history, physical, assessment, and plan. I have personally interviewed and examined the patient at bedside.     Now POD 3. Meeting appropriate post op milestones. BP controlled on procardia 30. Stable for discharge.     Temp:  [97.6 °F (36.4 °C)-98.9 °F (37.2 °C)] 98 °F (36.7 °C)  Pulse:  [] 92  Resp:  [16-19] 18  SpO2:  [95 %-100 %] 99 %  BP: (124-145)/(73-95) 124/73      JAKOB Armendariz MD  Maternal Fetal Medicine Fellow   PGY-6

## 2024-12-26 NOTE — LACTATION NOTE
12/26/24 1015   Equipment Type   Breast Pump Type double electric, hospital grade;double electric, personal;other (see comments)  (has spectra for home use)   Breast Pump Flange Type hard   Breast Pump Flange Size 24 mm   Breast Pumping   Breast Pumping Interventions frequent pumping encouraged   Breast Pumping double electric breast pump utilized   Community Referrals   Community Referrals outpatient lactation program;pediatric care provider     Patient is to be discharged with her baby in the NICU. States she plans to exclusively pump until baby is able to breastfeed. Encouraged a lot of skin to skin and to offer breast x 10 min to lick and learn and latch, then pump, and feed baby EBM until content. Reviewed Breastfeeding Guide and pumping discharge education. LC number written on the board.

## 2024-12-26 NOTE — NURSING
D/c teaching completed, pt will see her primary ob dr sanchez in 1week for bp&mood check, unable to sent a message to ob's office in [facility] , dr sanchez's office was called to attempt to schedule appt , however recording states office is closed today. Pt will check bp daily w/home cuff, pt confirmed that she will see her ob in 1 week

## 2024-12-26 NOTE — LACTATION NOTE
This note was copied from a baby's chart.  NICU Lactation Discharge Note:  Mother independently breastfeeding upon arrival. Infant latched and suckling. Milk noted on baby's cheeks when removed from breast. Highly recommend outpatient lactation consult. Mother stated that she is pumping frequently; pumped 80 ml with last pumping session (day 3). Discussed possibility of oversupply and how to manage.   Discussed importance of a deep latch, signs of a good latch, signs of milk transfer, and how to know if baby is getting enough.    Feeding plan for home:  Encouraged mother to continue transition to exclusive breast feeding under the guidance of the Pediatrician by increasing frequency and duration at breast, and gradually decreasing supplement volume; encouraged mother to put baby to breast on demand when early hunger cues are observed 3-4 times in 24-hour period; (progress to more at the breast feeds as infant shows more interest and ability) if signs of an effective latch and active milk transfer are noted, mother to allow baby to nurse 10-15 minutes; mother to use compression with breast feeding as discussed; mother to continue supplement of expressed breast milk (or formula) as needed until exclusive breast feeding is well established; mother to closely monitor for signs that baby is getting enough (hydration, calories) at breast AEB at least 5-6 heavy, wet diapers/day, 3-4 loose, yellow seedy stools/day, and a continued weight gain of 5-7 ounces/week; mother to follow-up with the Pediatrician for weight checks and as scheduled/needed.  Mom encouraged to double pump after breast feeding until infant fully established at breast and a full breast milk supply is established.    Early feeding cues: Sucking on fingers or hands or bringing hands toward the mouth                                  Sucking motions with mouth or tongue                                  Rooting or turning toward an object that brushes your  baby's mouth                                  Acting fretful           Try to latch the baby onto the breast until deep latch occurs or until 10 minutes pass. If unable to latch baby onto breasts or you do not see or hear any signs that baby is getting milk from your breast, bottle feed your baby.  Completed NICU lactation discharge teaching with good understanding verbalized by mother.  Provided mother with written handouts to reinforce verbal instructions.  Encouraged mother to participate in a breast feeding support group to facilitate meeting her breast feeding goals.  Provided mother with list of lactation community resources as well as NICU lactation contact numbers.  Eveline Esqueda, BSN, RNC, CLC, IBCLC

## 2024-12-27 ENCOUNTER — PATIENT MESSAGE (OUTPATIENT)
Dept: OBSTETRICS AND GYNECOLOGY | Facility: OTHER | Age: 29
End: 2024-12-27
Payer: COMMERCIAL

## 2024-12-30 ENCOUNTER — NURSE TRIAGE (OUTPATIENT)
Dept: ADMINISTRATIVE | Facility: CLINIC | Age: 29
End: 2024-12-30
Payer: COMMERCIAL

## 2024-12-31 ENCOUNTER — TELEPHONE (OUTPATIENT)
Dept: MATERNAL FETAL MEDICINE | Facility: CLINIC | Age: 29
End: 2024-12-31
Payer: COMMERCIAL

## 2024-12-31 LAB
FINAL PATHOLOGIC DIAGNOSIS: NORMAL
GROSS: NORMAL
Lab: NORMAL

## 2024-12-31 NOTE — TELEPHONE ENCOUNTER
Called to discuss unsustained high mild range BP on home monitoring. Pt reports subsequent evaluations were <140/<90. Recommended continued home BP checks, can titrate to nifedipine XL 60 mg daily if systolics are sustained >140 or diastolics are sustained >90, else continue at nifedipine XL 30 mg daily. Scheduled for 1 w BP check with Dr. Schwartz.       ANI Harris MD/MPH  Maternal Fetal Medicine

## 2024-12-31 NOTE — TELEPHONE ENCOUNTER
1 week post partum. Placed on procardia d/t HTN following delivery. Current /91. Last dose of procardia approx 5789-5284.  Care advice provided per protocol, with recommendation to be seen within 4 hrs of call. Pt INDERJIT.       Reason for Disposition   Systolic BP >= 140 OR Diastolic >= 90    Additional Information   Negative: Difficult to awaken or acting confused (e.g., disoriented, slurred speech)   Negative: SEVERE difficulty breathing (e.g., struggling for each breath, speaks in single words)   Negative: [1] Weakness of the face, arm or leg on one side of the body AND [2] new-onset   Negative: [1] Numbness (i.e., loss of sensation) of the face, arm or leg on one side of the body AND [2] new- onset   Negative: Seizure occurred and has stopped   Negative: Sounds like a life-threatening emergency to the triager   Negative: New hand or face swelling   Negative: Systolic BP >= 160 OR Diastolic >= 110   Negative: Patient sounds very sick or weak to the triager    Protocols used: Postpartum - High Blood Pressure-A-AH

## 2025-01-13 ENCOUNTER — PATIENT MESSAGE (OUTPATIENT)
Dept: MATERNAL FETAL MEDICINE | Facility: CLINIC | Age: 30
End: 2025-01-13
Payer: COMMERCIAL